# Patient Record
Sex: FEMALE | Race: BLACK OR AFRICAN AMERICAN | NOT HISPANIC OR LATINO
[De-identification: names, ages, dates, MRNs, and addresses within clinical notes are randomized per-mention and may not be internally consistent; named-entity substitution may affect disease eponyms.]

---

## 2017-01-10 ENCOUNTER — APPOINTMENT (OUTPATIENT)
Dept: MRI IMAGING | Facility: CLINIC | Age: 57
End: 2017-01-10

## 2017-01-19 ENCOUNTER — APPOINTMENT (OUTPATIENT)
Dept: MRI IMAGING | Facility: CLINIC | Age: 57
End: 2017-01-19

## 2017-01-19 ENCOUNTER — OUTPATIENT (OUTPATIENT)
Dept: OUTPATIENT SERVICES | Facility: HOSPITAL | Age: 57
LOS: 1 days | End: 2017-01-19
Payer: MEDICARE

## 2017-01-19 DIAGNOSIS — Z98.89 OTHER SPECIFIED POSTPROCEDURAL STATES: Chronic | ICD-10-CM

## 2017-01-19 DIAGNOSIS — M54.12 RADICULOPATHY, CERVICAL REGION: ICD-10-CM

## 2017-01-19 DIAGNOSIS — Z98.84 BARIATRIC SURGERY STATUS: Chronic | ICD-10-CM

## 2017-01-19 DIAGNOSIS — Z00.8 ENCOUNTER FOR OTHER GENERAL EXAMINATION: ICD-10-CM

## 2017-01-19 DIAGNOSIS — Z98.1 ARTHRODESIS STATUS: Chronic | ICD-10-CM

## 2017-01-19 PROCEDURE — 72141 MRI NECK SPINE W/O DYE: CPT

## 2017-05-04 ENCOUNTER — RX RENEWAL (OUTPATIENT)
Age: 57
End: 2017-05-04

## 2017-05-04 DIAGNOSIS — K21.9 GASTRO-ESOPHAGEAL REFLUX DISEASE W/OUT ESOPHAGITIS: ICD-10-CM

## 2017-05-23 ENCOUNTER — OTHER (OUTPATIENT)
Age: 57
End: 2017-05-23

## 2017-05-23 DIAGNOSIS — M25.569 PAIN IN UNSPECIFIED KNEE: ICD-10-CM

## 2017-06-01 ENCOUNTER — APPOINTMENT (OUTPATIENT)
Dept: ORTHOPEDIC SURGERY | Facility: CLINIC | Age: 57
End: 2017-06-01

## 2017-11-15 ENCOUNTER — RX RENEWAL (OUTPATIENT)
Age: 57
End: 2017-11-15

## 2018-01-18 ENCOUNTER — OUTPATIENT (OUTPATIENT)
Dept: OUTPATIENT SERVICES | Facility: HOSPITAL | Age: 58
LOS: 1 days | End: 2018-01-18
Payer: MEDICARE

## 2018-01-18 ENCOUNTER — APPOINTMENT (OUTPATIENT)
Dept: MRI IMAGING | Facility: CLINIC | Age: 58
End: 2018-01-18
Payer: MEDICARE

## 2018-01-18 DIAGNOSIS — Z98.89 OTHER SPECIFIED POSTPROCEDURAL STATES: Chronic | ICD-10-CM

## 2018-01-18 DIAGNOSIS — Z98.1 ARTHRODESIS STATUS: Chronic | ICD-10-CM

## 2018-01-18 DIAGNOSIS — Z00.8 ENCOUNTER FOR OTHER GENERAL EXAMINATION: ICD-10-CM

## 2018-01-18 DIAGNOSIS — Z98.84 BARIATRIC SURGERY STATUS: Chronic | ICD-10-CM

## 2018-01-18 PROCEDURE — 72148 MRI LUMBAR SPINE W/O DYE: CPT

## 2018-01-18 PROCEDURE — 72148 MRI LUMBAR SPINE W/O DYE: CPT | Mod: 26

## 2018-12-28 ENCOUNTER — APPOINTMENT (OUTPATIENT)
Dept: MRI IMAGING | Facility: CLINIC | Age: 58
End: 2018-12-28
Payer: MEDICARE

## 2018-12-28 ENCOUNTER — OUTPATIENT (OUTPATIENT)
Dept: OUTPATIENT SERVICES | Facility: HOSPITAL | Age: 58
LOS: 1 days | End: 2018-12-28
Payer: MEDICARE

## 2018-12-28 DIAGNOSIS — Z98.1 ARTHRODESIS STATUS: Chronic | ICD-10-CM

## 2018-12-28 DIAGNOSIS — Z00.8 ENCOUNTER FOR OTHER GENERAL EXAMINATION: ICD-10-CM

## 2018-12-28 DIAGNOSIS — Z98.89 OTHER SPECIFIED POSTPROCEDURAL STATES: Chronic | ICD-10-CM

## 2018-12-28 DIAGNOSIS — Z98.84 BARIATRIC SURGERY STATUS: Chronic | ICD-10-CM

## 2018-12-28 PROCEDURE — 72141 MRI NECK SPINE W/O DYE: CPT | Mod: 26

## 2018-12-28 PROCEDURE — 73221 MRI JOINT UPR EXTREM W/O DYE: CPT | Mod: 26,LT

## 2018-12-28 PROCEDURE — 72141 MRI NECK SPINE W/O DYE: CPT

## 2018-12-28 PROCEDURE — 73221 MRI JOINT UPR EXTREM W/O DYE: CPT

## 2019-03-08 ENCOUNTER — OUTPATIENT (OUTPATIENT)
Dept: OUTPATIENT SERVICES | Facility: HOSPITAL | Age: 59
LOS: 1 days | End: 2019-03-08
Payer: MEDICARE

## 2019-03-08 ENCOUNTER — APPOINTMENT (OUTPATIENT)
Dept: MRI IMAGING | Facility: CLINIC | Age: 59
End: 2019-03-08
Payer: MEDICARE

## 2019-03-08 DIAGNOSIS — Z98.1 ARTHRODESIS STATUS: Chronic | ICD-10-CM

## 2019-03-08 DIAGNOSIS — Z98.84 BARIATRIC SURGERY STATUS: Chronic | ICD-10-CM

## 2019-03-08 DIAGNOSIS — Z98.89 OTHER SPECIFIED POSTPROCEDURAL STATES: Chronic | ICD-10-CM

## 2019-03-08 DIAGNOSIS — Z00.8 ENCOUNTER FOR OTHER GENERAL EXAMINATION: ICD-10-CM

## 2019-03-08 PROCEDURE — 72148 MRI LUMBAR SPINE W/O DYE: CPT | Mod: 26

## 2019-03-08 PROCEDURE — 72148 MRI LUMBAR SPINE W/O DYE: CPT

## 2021-10-29 ENCOUNTER — EMERGENCY (EMERGENCY)
Facility: HOSPITAL | Age: 61
LOS: 1 days | Discharge: HOME | End: 2021-10-29
Attending: EMERGENCY MEDICINE
Payer: MEDICARE

## 2021-10-29 VITALS
HEIGHT: 63 IN | SYSTOLIC BLOOD PRESSURE: 137 MMHG | RESPIRATION RATE: 16 BRPM | HEART RATE: 63 BPM | TEMPERATURE: 98 F | WEIGHT: 179.02 LBS | OXYGEN SATURATION: 97 % | DIASTOLIC BLOOD PRESSURE: 89 MMHG

## 2021-10-29 DIAGNOSIS — Z98.89 OTHER SPECIFIED POSTPROCEDURAL STATES: Chronic | ICD-10-CM

## 2021-10-29 DIAGNOSIS — Z98.84 BARIATRIC SURGERY STATUS: Chronic | ICD-10-CM

## 2021-10-29 DIAGNOSIS — Z98.1 ARTHRODESIS STATUS: Chronic | ICD-10-CM

## 2021-10-29 LAB
ALBUMIN SERPL ELPH-MCNC: 4.1 G/DL — SIGNIFICANT CHANGE UP (ref 3.3–5.2)
ALP SERPL-CCNC: 64 U/L — SIGNIFICANT CHANGE UP (ref 40–120)
ALT FLD-CCNC: 17 U/L — SIGNIFICANT CHANGE UP
ANION GAP SERPL CALC-SCNC: 11 MMOL/L — SIGNIFICANT CHANGE UP (ref 5–17)
APPEARANCE UR: CLEAR — SIGNIFICANT CHANGE UP
AST SERPL-CCNC: 23 U/L — SIGNIFICANT CHANGE UP
BASOPHILS # BLD AUTO: 0.04 K/UL — SIGNIFICANT CHANGE UP (ref 0–0.2)
BASOPHILS NFR BLD AUTO: 0.7 % — SIGNIFICANT CHANGE UP (ref 0–2)
BILIRUB SERPL-MCNC: 0.3 MG/DL — LOW (ref 0.4–2)
BILIRUB UR-MCNC: NEGATIVE — SIGNIFICANT CHANGE UP
BUN SERPL-MCNC: 15.1 MG/DL — SIGNIFICANT CHANGE UP (ref 8–20)
CALCIUM SERPL-MCNC: 9.3 MG/DL — SIGNIFICANT CHANGE UP (ref 8.6–10.2)
CHLORIDE SERPL-SCNC: 103 MMOL/L — SIGNIFICANT CHANGE UP (ref 98–107)
CO2 SERPL-SCNC: 26 MMOL/L — SIGNIFICANT CHANGE UP (ref 22–29)
COLOR SPEC: YELLOW — SIGNIFICANT CHANGE UP
CREAT SERPL-MCNC: 0.73 MG/DL — SIGNIFICANT CHANGE UP (ref 0.5–1.3)
DIFF PNL FLD: NEGATIVE — SIGNIFICANT CHANGE UP
EOSINOPHIL # BLD AUTO: 0.13 K/UL — SIGNIFICANT CHANGE UP (ref 0–0.5)
EOSINOPHIL NFR BLD AUTO: 2.3 % — SIGNIFICANT CHANGE UP (ref 0–6)
GLUCOSE SERPL-MCNC: 105 MG/DL — HIGH (ref 70–99)
GLUCOSE UR QL: NEGATIVE MG/DL — SIGNIFICANT CHANGE UP
HCT VFR BLD CALC: 36.9 % — SIGNIFICANT CHANGE UP (ref 34.5–45)
HGB BLD-MCNC: 11.9 G/DL — SIGNIFICANT CHANGE UP (ref 11.5–15.5)
IMM GRANULOCYTES NFR BLD AUTO: 0.2 % — SIGNIFICANT CHANGE UP (ref 0–1.5)
KETONES UR-MCNC: NEGATIVE — SIGNIFICANT CHANGE UP
LEUKOCYTE ESTERASE UR-ACNC: NEGATIVE — SIGNIFICANT CHANGE UP
LYMPHOCYTES # BLD AUTO: 2.62 K/UL — SIGNIFICANT CHANGE UP (ref 1–3.3)
LYMPHOCYTES # BLD AUTO: 45.6 % — HIGH (ref 13–44)
MCHC RBC-ENTMCNC: 30.1 PG — SIGNIFICANT CHANGE UP (ref 27–34)
MCHC RBC-ENTMCNC: 32.2 GM/DL — SIGNIFICANT CHANGE UP (ref 32–36)
MCV RBC AUTO: 93.2 FL — SIGNIFICANT CHANGE UP (ref 80–100)
MONOCYTES # BLD AUTO: 0.45 K/UL — SIGNIFICANT CHANGE UP (ref 0–0.9)
MONOCYTES NFR BLD AUTO: 7.8 % — SIGNIFICANT CHANGE UP (ref 2–14)
NEUTROPHILS # BLD AUTO: 2.49 K/UL — SIGNIFICANT CHANGE UP (ref 1.8–7.4)
NEUTROPHILS NFR BLD AUTO: 43.4 % — SIGNIFICANT CHANGE UP (ref 43–77)
NITRITE UR-MCNC: NEGATIVE — SIGNIFICANT CHANGE UP
PH UR: 7 — SIGNIFICANT CHANGE UP (ref 5–8)
PLATELET # BLD AUTO: 164 K/UL — SIGNIFICANT CHANGE UP (ref 150–400)
POTASSIUM SERPL-MCNC: 5 MMOL/L — SIGNIFICANT CHANGE UP (ref 3.5–5.3)
POTASSIUM SERPL-SCNC: 5 MMOL/L — SIGNIFICANT CHANGE UP (ref 3.5–5.3)
PROT SERPL-MCNC: 6.8 G/DL — SIGNIFICANT CHANGE UP (ref 6.6–8.7)
PROT UR-MCNC: NEGATIVE MG/DL — SIGNIFICANT CHANGE UP
RBC # BLD: 3.96 M/UL — SIGNIFICANT CHANGE UP (ref 3.8–5.2)
RBC # FLD: 12.6 % — SIGNIFICANT CHANGE UP (ref 10.3–14.5)
SODIUM SERPL-SCNC: 140 MMOL/L — SIGNIFICANT CHANGE UP (ref 135–145)
SP GR SPEC: 1 — LOW (ref 1.01–1.02)
UROBILINOGEN FLD QL: NEGATIVE MG/DL — SIGNIFICANT CHANGE UP
WBC # BLD: 5.74 K/UL — SIGNIFICANT CHANGE UP (ref 3.8–10.5)
WBC # FLD AUTO: 5.74 K/UL — SIGNIFICANT CHANGE UP (ref 3.8–10.5)

## 2021-10-29 PROCEDURE — 96376 TX/PRO/DX INJ SAME DRUG ADON: CPT

## 2021-10-29 PROCEDURE — 80053 COMPREHEN METABOLIC PANEL: CPT

## 2021-10-29 PROCEDURE — 96375 TX/PRO/DX INJ NEW DRUG ADDON: CPT

## 2021-10-29 PROCEDURE — G1004: CPT

## 2021-10-29 PROCEDURE — 74177 CT ABD & PELVIS W/CONTRAST: CPT | Mod: ME

## 2021-10-29 PROCEDURE — 76705 ECHO EXAM OF ABDOMEN: CPT | Mod: 26,RT

## 2021-10-29 PROCEDURE — 74177 CT ABD & PELVIS W/CONTRAST: CPT | Mod: 26,ME

## 2021-10-29 PROCEDURE — 99284 EMERGENCY DEPT VISIT MOD MDM: CPT | Mod: 25

## 2021-10-29 PROCEDURE — 99284 EMERGENCY DEPT VISIT MOD MDM: CPT

## 2021-10-29 PROCEDURE — 85025 COMPLETE CBC W/AUTO DIFF WBC: CPT

## 2021-10-29 PROCEDURE — 76705 ECHO EXAM OF ABDOMEN: CPT

## 2021-10-29 PROCEDURE — 81003 URINALYSIS AUTO W/O SCOPE: CPT

## 2021-10-29 PROCEDURE — 96374 THER/PROPH/DIAG INJ IV PUSH: CPT | Mod: XU

## 2021-10-29 PROCEDURE — 36415 COLL VENOUS BLD VENIPUNCTURE: CPT

## 2021-10-29 RX ORDER — KETOROLAC TROMETHAMINE 30 MG/ML
15 SYRINGE (ML) INJECTION ONCE
Refills: 0 | Status: DISCONTINUED | OUTPATIENT
Start: 2021-10-29 | End: 2021-10-29

## 2021-10-29 RX ORDER — SODIUM CHLORIDE 9 MG/ML
1000 INJECTION INTRAMUSCULAR; INTRAVENOUS; SUBCUTANEOUS ONCE
Refills: 0 | Status: COMPLETED | OUTPATIENT
Start: 2021-10-29 | End: 2021-10-29

## 2021-10-29 RX ORDER — MORPHINE SULFATE 50 MG/1
4 CAPSULE, EXTENDED RELEASE ORAL ONCE
Refills: 0 | Status: DISCONTINUED | OUTPATIENT
Start: 2021-10-29 | End: 2021-10-29

## 2021-10-29 RX ADMIN — SODIUM CHLORIDE 1000 MILLILITER(S): 9 INJECTION INTRAMUSCULAR; INTRAVENOUS; SUBCUTANEOUS at 17:07

## 2021-10-29 RX ADMIN — Medication 15 MILLIGRAM(S): at 18:58

## 2021-10-29 RX ADMIN — Medication 15 MILLIGRAM(S): at 21:52

## 2021-10-29 RX ADMIN — Medication 15 MILLIGRAM(S): at 19:20

## 2021-10-29 RX ADMIN — MORPHINE SULFATE 4 MILLIGRAM(S): 50 CAPSULE, EXTENDED RELEASE ORAL at 17:07

## 2021-10-29 NOTE — ED STATDOCS - NSICDXFAMILYHX_GEN_ALL_CORE_FT
FAMILY HISTORY:  Family history of diabetes mellitus  Family history of ovarian cancer, Mother  of ovarian cancer at age 62

## 2021-10-29 NOTE — ED PROVIDER NOTE - NSFOLLOWUPINSTRUCTIONS_ED_ALL_ED_FT
please follow with primary care doctor and with pain management   please bring all results to your follow up appointment   continue all prescribed medications   new or worsening symptoms return to the ER

## 2021-10-29 NOTE — ED PROVIDER NOTE - PATIENT PORTAL LINK FT
You can access the FollowMyHealth Patient Portal offered by [facility] by registering at the following website: [email or web address]. By joining [facility]’s FollowMyHealth portal, you will also be able to view your health information using other applications (apps) compatible with our system.

## 2021-10-29 NOTE — ED STATDOCS - ATTENDING CONTRIBUTION TO CARE
61 y/o female with h/o right back pain comes in c/o right sided abdominal pain different than previous episodes  no n/v/d no fever   UA CT

## 2021-10-29 NOTE — ED STATDOCS - NSICDXPASTSURGICALHX_GEN_ALL_CORE_FT
PAST SURGICAL HISTORY:  History of carpal tunnel release on left wrist 2012    S/P bariatric surgery 2003    S/P  x 2    S/P cervical spinal fusion     S/P shoulder surgery Left (  )  & Joint resection in 2009

## 2021-10-29 NOTE — ED STATDOCS - OBJECTIVE STATEMENT
61 y/o female with h/o right back pain comes in c/o right sided abdominal pain different than previous episodes  no n/v/d no fever

## 2021-11-01 ENCOUNTER — EMERGENCY (EMERGENCY)
Facility: HOSPITAL | Age: 61
LOS: 1 days | Discharge: HOME | End: 2021-11-01
Attending: STUDENT IN AN ORGANIZED HEALTH CARE EDUCATION/TRAINING PROGRAM
Payer: MEDICARE

## 2021-11-01 VITALS
SYSTOLIC BLOOD PRESSURE: 158 MMHG | DIASTOLIC BLOOD PRESSURE: 66 MMHG | OXYGEN SATURATION: 98 % | RESPIRATION RATE: 20 BRPM | TEMPERATURE: 99 F | HEIGHT: 63 IN | HEART RATE: 60 BPM | WEIGHT: 179.02 LBS

## 2021-11-01 DIAGNOSIS — Z98.89 OTHER SPECIFIED POSTPROCEDURAL STATES: Chronic | ICD-10-CM

## 2021-11-01 DIAGNOSIS — Z98.84 BARIATRIC SURGERY STATUS: Chronic | ICD-10-CM

## 2021-11-01 DIAGNOSIS — Z98.1 ARTHRODESIS STATUS: Chronic | ICD-10-CM

## 2021-11-01 PROCEDURE — 99284 EMERGENCY DEPT VISIT MOD MDM: CPT

## 2021-11-01 PROCEDURE — 82962 GLUCOSE BLOOD TEST: CPT

## 2021-11-01 PROCEDURE — 99283 EMERGENCY DEPT VISIT LOW MDM: CPT | Mod: 25

## 2021-11-01 PROCEDURE — 96372 THER/PROPH/DIAG INJ SC/IM: CPT

## 2021-11-01 RX ORDER — LIDOCAINE 4 G/100G
1 CREAM TOPICAL ONCE
Refills: 0 | Status: COMPLETED | OUTPATIENT
Start: 2021-11-01 | End: 2021-11-01

## 2021-11-01 RX ORDER — ACETAMINOPHEN 500 MG
975 TABLET ORAL ONCE
Refills: 0 | Status: COMPLETED | OUTPATIENT
Start: 2021-11-01 | End: 2021-11-01

## 2021-11-01 RX ORDER — FAMOTIDINE 10 MG/ML
1 INJECTION INTRAVENOUS
Qty: 7 | Refills: 0
Start: 2021-11-01 | End: 2021-11-07

## 2021-11-01 RX ORDER — KETOROLAC TROMETHAMINE 30 MG/ML
30 SYRINGE (ML) INJECTION ONCE
Refills: 0 | Status: DISCONTINUED | OUTPATIENT
Start: 2021-11-01 | End: 2021-11-01

## 2021-11-01 RX ORDER — SUCRALFATE 1 G
1 TABLET ORAL
Qty: 12 | Refills: 0
Start: 2021-11-01 | End: 2021-11-04

## 2021-11-01 RX ADMIN — Medication 30 MILLIGRAM(S): at 15:16

## 2021-11-01 RX ADMIN — LIDOCAINE 1 PATCH: 4 CREAM TOPICAL at 15:16

## 2021-11-01 RX ADMIN — Medication 975 MILLIGRAM(S): at 15:16

## 2021-11-01 NOTE — ED STATDOCS - OBJECTIVE STATEMENT
61 y/o female with PMHx of arthritis, HTN and DM on metformin c/o abdominal pain. Patient reports of abdominal pain radiating to the back x4 days and diarrhea yesterday. Patient was here Friday for the same issue. Patient had a CAT scan and ultrasound done which came back normal. Patient took percocet @7am with no relief. Patient does have a pain specialist. Denies vomiting, fever, dysuria, hematuria, chest pain, difficulty breathing, 59 y/o female with PMHx of arthritis, HTN and DM on metformin c/o abdominal pain. Patient reports of abdominal pain radiating to the back x4 days and diarrhea yesterday. Patient was here Friday for the same issue. Patient had a CAT scan and ultrasound which were negative. Patient took percocet @7am with no relief. Patient does have a pain specialist. Denies vomiting, fever, dysuria, hematuria, chest pain, difficulty breathing, 59 y/o female with PMHx of arthritis, HTN and DM on metformin c/o abdominal pain. Patient reports of abdominal pain radiating to the flank x 4 days and nonbloody diarrhea yesterday. Patient was here Friday for the same issue. Patient had a CT scan and ultrasound which were negative. Patient took percocet @7am with no relief. Patient follows with a pain specialist. Denies vomiting, fever, dysuria, hematuria, chest pain, difficulty breathing. She denies any change in pain since last visit.

## 2021-11-01 NOTE — ED STATDOCS - PHYSICAL EXAMINATION
General: tearful, no acute distress  Head: normocephalic, atraumatic  Eyes: PERRL   Mouth: moist mucous membranes  Neck: supple neck, no lymphadenopathy  CV: normal rate and rhythm, no LE edema, peripheral pulses 2+ bilateral UE and LE  Respiratory: clear to auscultation bilaterally  Abdomen: soft, tenderness to right abdomen.   : no suprapubic tenderness, no CVAT  MSK: no joint deformities, tenderness to right lower back , right flank   Neuro: alert and oriented x3, speech clear, moving all extremities spontaneously without difficulty  Skin: no rash noted General: tearful, no acute distress  Head: normocephalic, atraumatic  Eyes: PERRL   Mouth: moist mucous membranes  Neck: supple neck, no lymphadenopathy  CV: normal rate and rhythm, no LE edema, peripheral pulses 2+ bilateral UE and LE  Respiratory: clear to auscultation bilaterally  Abdomen: soft, tenderness to palpation of right abdomen  : no suprapubic tenderness, no CVAT  MSK: no joint deformities, tenderness to palpation of right lower back and flank   Neuro: alert and oriented x3, speech clear, moving all extremities spontaneously without difficulty  Skin: no rash noted on back, flank, or abdomen

## 2021-11-01 NOTE — ED ADULT TRIAGE NOTE - CHIEF COMPLAINT QUOTE
Patient present to ER C/O abdominal pain, patient seen at [facility] 2 days ago, patient to follow up with GI, denies N/V, resp even/unlabored. Patient present to ER C/O abdominal pain, patient seen at [facility] 2 days ago, patient to follow up with GI, denies N/V, resp even/unlabored.

## 2021-11-01 NOTE — ED STATDOCS - ATTENDING CONTRIBUTION TO CARE
I have personally performed a face to face medical and diagnostic evaluation of the patient. I have discussed the case with the ACP and reviewed their addition to the note. This note including HPI / ROS / PE / MDM was written by the scribe in my presence.

## 2021-11-01 NOTE — ED STATDOCS - NSFOLLOWUPINSTRUCTIONS_ED_ALL_ED_FT
You were seen and evaluated in the emergency room for abdominal pain.    Please follow up with your primary care provider in the next few days.  You should also follow up with your pain management doctor.    Take 650mg tylenol every 6 hours as needed for pain.  Take 400mg ibuprofen every 6 hours as needed for pain, make sure to take with food.  Use a lidocaine patch (salonpas) on the area for up to 12 hours at a time as needed.     You can apply heat compress for 20 to 30 minutes every 2 hours.     Continue all other home medications as prescribed.    Seek care immediately if:   You have severe pain in your abdomen.   Your bowel movement has blood in it, or looks like black tar.   You cannot stop vomiting, or vomit blood.  Your abdomen is larger than usual, very painful, and hard.   You stop passing gas or having bowel movements.   You have heavy vaginal bleeding.  You have chest pain or shortness of breath.  You feel weak, dizzy, or faint.  Or any worse or abnormal symptoms.     Please read all attached.

## 2021-11-01 NOTE — ED STATDOCS - NS ED ROS FT
General: no fevers  Head: no headaches  Eyes: no vision change  ENT: no nasal discharge/congestion, no sore throat, no ear pain  CV: no chest pain  Resp: no SOB, no cough  GI: no N/V +D, + abdominal pain, no blood in stool  : no dysuria, no hematuria, no vaginal discharge  MSK: no joint pain, no muscle aches, no neck pain +back pain  Skin: no new rash  Neuro: no focal weakness, no change in sensation

## 2021-11-01 NOTE — ED ADULT NURSE NOTE - CHIEF COMPLAINT QUOTE
Patient present to ER C/O abdominal pain, patient seen at [facility] 2 days ago, patient to follow up with GI, denies N/V, resp even/unlabored.

## 2021-11-01 NOTE — ED STATDOCS - CARE PROVIDER_API CALL
Natalie Avalos (DO)  Gastroenterology  [address], [address]  [place] [address]  Phone: [phone]  Fax: [phone]  Follow Up Time:

## 2021-11-01 NOTE — ED STATDOCS - PROGRESS NOTE DETAILS
PA NOTE: Pt with improvement in symptoms s/p treatment. Advised to follow up with Dr. Avalos and return to ED for any new or worsening symptoms.

## 2021-11-01 NOTE — ED ADULT NURSE NOTE - OBJECTIVE STATEMENT
pt with reports of abd pain, appears comfortable. even and unlabored resps present, skin warm dry and intact. no vomiting, reports here 2 days ago for same with negative workup. even and unlabored resps no chest pain no SOB

## 2021-11-01 NOTE — ED STATDOCS - CLINICAL SUMMARY MEDICAL DECISION MAKING FREE TEXT BOX
59 y/o woman present with CAT and ultrasound negative. No   Will give pain medication and   Will give information for out patient follow up with back and spine. 59 y/o woman present with persistent symptoms. Recent CAT scan and ultrasound were negative. No actionable alterations at this time.  Will give pain medication and precautions instruction to return to the ED. Will give information for out patient follow up with back and spine specialists. 61 y/o woman present with persistent symptoms. Recent CT scan and ultrasound were negative with no actionable findings and no change in quality of symptoms from work up. Will give pain medication and discussed return precautions. Discussed need for close outpatient follow up with back and pain management as well as GI.

## 2021-11-01 NOTE — ED STATDOCS - PATIENT PORTAL LINK FT
You can access the FollowMyHealth Patient Portal offered by [facility] by registering at the following website: [email or web address]. By joining [facility]’s FollowMyHealth portal, you will also be able to view your health information using other applications (apps) compatible with our system.

## 2022-01-19 ENCOUNTER — APPOINTMENT (OUTPATIENT)
Dept: GASTROENTEROLOGY | Facility: CLINIC | Age: 62
End: 2022-01-19
Payer: MEDICARE

## 2022-01-19 VITALS
OXYGEN SATURATION: 97 % | HEART RATE: 76 BPM | SYSTOLIC BLOOD PRESSURE: 130 MMHG | TEMPERATURE: 98 F | WEIGHT: 186 LBS | DIASTOLIC BLOOD PRESSURE: 80 MMHG | HEIGHT: 63 IN | BODY MASS INDEX: 32.96 KG/M2 | RESPIRATION RATE: 16 BRPM

## 2022-01-19 PROCEDURE — 99204 OFFICE O/P NEW MOD 45 MIN: CPT

## 2022-01-19 NOTE — PHYSICAL EXAM
[General Appearance - Alert] : alert [General Appearance - In No Acute Distress] : in no acute distress [General Appearance - Well Nourished] : well nourished [General Appearance - Well Developed] : well developed [Sclera] : the sclera and conjunctiva were normal [] : no respiratory distress [Respiration, Rhythm And Depth] : normal respiratory rhythm and effort [Exaggerated Use Of Accessory Muscles For Inspiration] : no accessory muscle use [Auscultation Breath Sounds / Voice Sounds] : lungs were clear to auscultation bilaterally [Apical Impulse] : the apical impulse was normal [Heart Rate And Rhythm] : heart rate was normal and rhythm regular [Heart Sounds] : normal S1 and S2 [Bowel Sounds] : normal bowel sounds [Abdomen Soft] : soft [Abdomen Tenderness] : non-tender [Normal Sphincter Tone] : normal sphincter tone [No Rectal Mass] : no rectal mass [Internal Hemorrhoid] : no internal hemorrhoids [External Hemorrhoid] : no external hemorrhoids [Occult Blood Positive] : stool was negative for occult blood [Cervical Lymph Nodes Enlarged Posterior Bilaterally] : posterior cervical [Abnormal Walk] : normal gait [Skin Color & Pigmentation] : normal skin color and pigmentation [Oriented To Time, Place, And Person] : oriented to person, place, and time [Impaired Insight] : insight and judgment were intact [Affect] : the affect was normal

## 2022-01-19 NOTE — HISTORY OF PRESENT ILLNESS
[de-identified] : Patient was seen in the emergency room October 29 and November 1.  I reviewed the hospital records including labs and CT images.\par    Patient has a history of diabetes hypertension depression GERD high cholesterol.  She had a gastric bypass in 2003.  Patient states she had 3 days of right flank pain radiating to the lower back.  Worse with directly laying on that side.  CT showed a distended gallbladder, ultrasound showed the same.  LFTs and CBC were normal .  CBD was normal.  She had no constipation diarrhea black stools or bloody stools.  She has no unintentional weight loss no change in bowel habits no family history of colon cancer or colon polyps.  Patient has history of acid reflux regurgitation and heartburn for the past 10 years.  Controlled with Protonix 40 mg a day.  Symptoms are moderate to severe without medication.  Symptoms are controlled with Protonix daily.  She has no dysphagia.\par    Patient thinks her last endoscopy and colonoscopy were 10 years ago.  She states both were negative.  She has no family history of colon cancer or colon polyps.  No rectal bleeding.

## 2022-01-19 NOTE — ASSESSMENT
[FreeTextEntry1] : A/P\par Patient had right flank pain which has resolved.  Symptoms are likely musculoskeletal.  Incidentally she was found to have a distended gallbladder on CAT scan and ultrasound which clinically are insignificant.  Liver function tests were normal.\par \par gerd\par Today's instructions for acid reflux include avoid provocative foods. For example citrus alcohol coffee chocolate mints. Smaller meals, no eating 3 hours prior to bedtime and elevate head of the bed prior to sleep.\par Continue Protonix\par \par \par Screening colonoscopy in 5 years.  Follow-up as needed

## 2022-07-15 ENCOUNTER — APPOINTMENT (OUTPATIENT)
Dept: ORTHOPEDIC SURGERY | Facility: CLINIC | Age: 62
End: 2022-07-15

## 2022-07-15 VITALS
HEIGHT: 63 IN | SYSTOLIC BLOOD PRESSURE: 174 MMHG | HEART RATE: 54 BPM | DIASTOLIC BLOOD PRESSURE: 94 MMHG | WEIGHT: 186 LBS | BODY MASS INDEX: 32.96 KG/M2

## 2022-07-15 DIAGNOSIS — M17.11 UNILATERAL PRIMARY OSTEOARTHRITIS, RIGHT KNEE: ICD-10-CM

## 2022-07-15 PROCEDURE — 99204 OFFICE O/P NEW MOD 45 MIN: CPT

## 2022-07-15 NOTE — HISTORY OF PRESENT ILLNESS
[de-identified] : 61 year old female presents complaining of right knee pain with no recent traumatic injury.She has a long-standing history of right knee osteoarthritis and has had conservative treatment with injections physical therapy anti-inflammatory medication. She currently goes to pain management and is on oxycodone 10 mg 4-6 times a day for pain.She denies fever chills night sweats has no paresthesia no extremity no other orthopedic complaints  She ambulates without assistive devices. Her pain to be  8/10 worse with ascending descending Stairs bending and squatting type maneuvers motor his ambulation.

## 2022-07-15 NOTE — DISCUSSION/SUMMARY
[Medication Risks Reviewed] : Medication risks reviewed [Surgical risks reviewed] : Surgical risks reviewed [de-identified] : Right knee osteoarthritis\par Right knee pain\par \par \par \par Treatment options were reviewed with the patient demonstrate arthritis of the right knee failure of conservative treatment. Risks benefits and alternatives to right total knee arthroplasty review with the patient the preoperative operative and postoperative course also reviewed and all the questions were answered\par \par The patient is an year old women with bone on bone arthritis of their right knee . Based upon the patients continued symptoms and failure to respond to conservative treatment I have recommended a left total knee replacement for this patient. A long discussion took place with the patient describing what a total joint replacement is and what the procedure would entail. A total knee model, similar to the implant that will be used during the operation was utilized to demonstrate and to discuss the various bearing surfaces of the implants. The hospitalization and post-operative care and rehabilitation were also discussed. The use of perioperative antibiotics and DVT prophylaxis were discussed. The risk, benefits and alternatives to a surgical intervention were discussed at length with the patient. The patient was also advised of risks related to the medical comorbidities and elevated body mass index (BMI).  A lengthy discussion took place to review the most common complications including but not limited to: deep vein thrombosis, pulmonary embolus, heart attack, stroke, infection, wound breakdown, numbness, damage to nerves, tendon, muscles, arteries or other blood vessels, death and other possible complications from anesthesia. The patient was told that we will take steps to minimize these risks by using sterile technique, antibiotics and DVT prophylaxis when appropriate and follow the patient postoperatively in the office setting to monitor progress. The possibility of recurrent pain, no improvement in pain and actual worsening of pain were also discussed with the patient. \par \par The patient was advised of the goals, alternatives, risks and benefits of autologous, directed and banked blood product transfusions for perioperative blood losses.\par The discharge plan of care focused on the patient going home following surgery.  I encouraged the patient to make the necessary arrangements to have someone stay with them when they are discharged home.  Following discharge, a home care nurse will visit the patient.  He/she will open your home care case and request home physical therapy services.  Home physical therapy will commence following discharge provided it is appropriate and covered by his health insurance benefit plan.  \par \par The patient was advised that they will require a medical preoperative risk evaluation by their PCP. Further medical subspecialty clearances such as cardiac may be indicated if felt needed by their PCP.\par \par The benefits of surgery were discussed with the patient including the potential for improving his/her current clinical condition through operative intervention. Alternatives to surgical intervention including continued conservative management were also discussed in detail. All questions were answered to the satisfaction of the patient. The treatment plan of care, as well as a model of a total knee equivalent to the one that will be used for their total joint replacement, was shared with the patient.  The patient agreed to the plan of care as well as the use of implants in their total joint replacement.\par The patient participated in an interactive discussion of the TKR implant planned for their surgery with questions answered, agreed with the treatment plan, and has decided to move forward with elective TKR as planned.\par

## 2022-07-15 NOTE — REASON FOR VISIT
[Knee Pain] : knee pain [Osteoarthritis, Knee] : osteoarthritis, knee [FreeTextEntry2] : right knee pain

## 2022-07-15 NOTE — PHYSICAL EXAM
[Antalgic] : antalgic [LE] : Sensory: Intact in bilateral lower extremities [DP] : dorsalis pedis 2+ and symmetric bilaterally [PT] : posterior tibial 2+ and symmetric bilaterally [Normal] : no peripheral adenopathy appreciated [de-identified] : Examination of the right knee: Skin is warm and dry without lesions there is a varus deformity noted his tendons all 3 compartments range of motion 0-115° of flexion ligamentous exam stable with varus stress anterior posterior drawer there is palpable effusion noted. [de-identified] : 3 views of the right knee from an outside facility: There is arthritic changes noted with almost bone-on-bone contact in the medial patellofemoral compartments as better to osteophytes and cystic changes.\par \par

## 2022-08-19 ENCOUNTER — OUTPATIENT (OUTPATIENT)
Dept: OUTPATIENT SERVICES | Facility: HOSPITAL | Age: 62
LOS: 1 days | End: 2022-08-19
Payer: MEDICARE

## 2022-08-19 VITALS
RESPIRATION RATE: 8 BRPM | DIASTOLIC BLOOD PRESSURE: 88 MMHG | HEIGHT: 63 IN | TEMPERATURE: 98 F | WEIGHT: 188.94 LBS | OXYGEN SATURATION: 96 % | SYSTOLIC BLOOD PRESSURE: 142 MMHG | HEART RATE: 61 BPM

## 2022-08-19 DIAGNOSIS — Z01.818 ENCOUNTER FOR OTHER PREPROCEDURAL EXAMINATION: ICD-10-CM

## 2022-08-19 DIAGNOSIS — Z98.1 ARTHRODESIS STATUS: Chronic | ICD-10-CM

## 2022-08-19 DIAGNOSIS — Z98.89 OTHER SPECIFIED POSTPROCEDURAL STATES: Chronic | ICD-10-CM

## 2022-08-19 DIAGNOSIS — Z29.9 ENCOUNTER FOR PROPHYLACTIC MEASURES, UNSPECIFIED: ICD-10-CM

## 2022-08-19 DIAGNOSIS — M19.90 UNSPECIFIED OSTEOARTHRITIS, UNSPECIFIED SITE: ICD-10-CM

## 2022-08-19 DIAGNOSIS — I10 ESSENTIAL (PRIMARY) HYPERTENSION: ICD-10-CM

## 2022-08-19 DIAGNOSIS — Z98.84 BARIATRIC SURGERY STATUS: Chronic | ICD-10-CM

## 2022-08-19 DIAGNOSIS — J44.9 CHRONIC OBSTRUCTIVE PULMONARY DISEASE, UNSPECIFIED: ICD-10-CM

## 2022-08-19 DIAGNOSIS — M48.00 SPINAL STENOSIS, SITE UNSPECIFIED: ICD-10-CM

## 2022-08-19 LAB
A1C WITH ESTIMATED AVERAGE GLUCOSE RESULT: 6.4 % — HIGH (ref 4–5.6)
ANION GAP SERPL CALC-SCNC: 12 MMOL/L — SIGNIFICANT CHANGE UP (ref 5–17)
APTT BLD: 30.5 SEC — SIGNIFICANT CHANGE UP (ref 27.5–35.5)
BASOPHILS # BLD AUTO: 0.04 K/UL — SIGNIFICANT CHANGE UP (ref 0–0.2)
BASOPHILS NFR BLD AUTO: 0.8 % — SIGNIFICANT CHANGE UP (ref 0–2)
BLD GP AB SCN SERPL QL: SIGNIFICANT CHANGE UP
BUN SERPL-MCNC: 10.6 MG/DL — SIGNIFICANT CHANGE UP (ref 8–20)
CALCIUM SERPL-MCNC: 10 MG/DL — SIGNIFICANT CHANGE UP (ref 8.4–10.5)
CHLORIDE SERPL-SCNC: 104 MMOL/L — SIGNIFICANT CHANGE UP (ref 98–107)
CO2 SERPL-SCNC: 26 MMOL/L — SIGNIFICANT CHANGE UP (ref 22–29)
CREAT SERPL-MCNC: 1.01 MG/DL — SIGNIFICANT CHANGE UP (ref 0.5–1.3)
EGFR: 64 ML/MIN/1.73M2 — SIGNIFICANT CHANGE UP
EOSINOPHIL # BLD AUTO: 0.11 K/UL — SIGNIFICANT CHANGE UP (ref 0–0.5)
EOSINOPHIL NFR BLD AUTO: 2.3 % — SIGNIFICANT CHANGE UP (ref 0–6)
ESTIMATED AVERAGE GLUCOSE: 137 MG/DL — HIGH (ref 68–114)
GLUCOSE SERPL-MCNC: 130 MG/DL — HIGH (ref 70–99)
HCT VFR BLD CALC: 40.4 % — SIGNIFICANT CHANGE UP (ref 34.5–45)
HGB BLD-MCNC: 13.4 G/DL — SIGNIFICANT CHANGE UP (ref 11.5–15.5)
IMM GRANULOCYTES NFR BLD AUTO: 0.2 % — SIGNIFICANT CHANGE UP (ref 0–1.5)
INR BLD: 1.08 RATIO — SIGNIFICANT CHANGE UP (ref 0.88–1.16)
LYMPHOCYTES # BLD AUTO: 2.73 K/UL — SIGNIFICANT CHANGE UP (ref 1–3.3)
LYMPHOCYTES # BLD AUTO: 57.7 % — HIGH (ref 13–44)
MCHC RBC-ENTMCNC: 29.8 PG — SIGNIFICANT CHANGE UP (ref 27–34)
MCHC RBC-ENTMCNC: 33.2 GM/DL — SIGNIFICANT CHANGE UP (ref 32–36)
MCV RBC AUTO: 90 FL — SIGNIFICANT CHANGE UP (ref 80–100)
MONOCYTES # BLD AUTO: 0.34 K/UL — SIGNIFICANT CHANGE UP (ref 0–0.9)
MONOCYTES NFR BLD AUTO: 7.2 % — SIGNIFICANT CHANGE UP (ref 2–14)
MRSA PCR RESULT.: SIGNIFICANT CHANGE UP
NEUTROPHILS # BLD AUTO: 1.5 K/UL — LOW (ref 1.8–7.4)
NEUTROPHILS NFR BLD AUTO: 31.8 % — LOW (ref 43–77)
PLATELET # BLD AUTO: 176 K/UL — SIGNIFICANT CHANGE UP (ref 150–400)
POTASSIUM SERPL-MCNC: 3.9 MMOL/L — SIGNIFICANT CHANGE UP (ref 3.5–5.3)
POTASSIUM SERPL-SCNC: 3.9 MMOL/L — SIGNIFICANT CHANGE UP (ref 3.5–5.3)
PROTHROM AB SERPL-ACNC: 12.5 SEC — SIGNIFICANT CHANGE UP (ref 10.5–13.4)
RBC # BLD: 4.49 M/UL — SIGNIFICANT CHANGE UP (ref 3.8–5.2)
RBC # FLD: 12.4 % — SIGNIFICANT CHANGE UP (ref 10.3–14.5)
S AUREUS DNA NOSE QL NAA+PROBE: DETECTED
SODIUM SERPL-SCNC: 142 MMOL/L — SIGNIFICANT CHANGE UP (ref 135–145)
WBC # BLD: 4.73 K/UL — SIGNIFICANT CHANGE UP (ref 3.8–10.5)
WBC # FLD AUTO: 4.73 K/UL — SIGNIFICANT CHANGE UP (ref 3.8–10.5)

## 2022-08-19 PROCEDURE — 93010 ELECTROCARDIOGRAM REPORT: CPT

## 2022-08-19 PROCEDURE — G0463: CPT

## 2022-08-19 PROCEDURE — 93005 ELECTROCARDIOGRAM TRACING: CPT

## 2022-08-19 RX ORDER — IRON,CARBONYL/ASCORBIC ACID 65MG-125MG
1 TABLET, DELAYED RELEASE (ENTERIC COATED) ORAL
Qty: 0 | Refills: 0 | DISCHARGE

## 2022-08-19 RX ORDER — LATANOPROSTENE BUNOD 0.24 MG/ML
1 SOLUTION/ DROPS OPHTHALMIC
Qty: 0 | Refills: 0 | DISCHARGE

## 2022-08-19 RX ORDER — PREGABALIN 225 MG/1
1 CAPSULE ORAL
Qty: 0 | Refills: 0 | DISCHARGE

## 2022-08-19 RX ORDER — TIZANIDINE 4 MG/1
1 TABLET ORAL
Qty: 0 | Refills: 0 | DISCHARGE

## 2022-08-19 RX ORDER — DICLOFENAC SODIUM 30 MG/G
0 GEL TOPICAL
Qty: 0 | Refills: 0 | DISCHARGE

## 2022-08-19 RX ORDER — BENZOYL PEROXIDE MICRONIZED 5.8 %
1 TOWELETTE (EA) TOPICAL
Qty: 0 | Refills: 0 | DISCHARGE

## 2022-08-19 NOTE — H&P PST ADULT - PROBLEM SELECTOR PLAN 4
Caprini Score 9: High risk, pharmocologic VTE prophylaxis indicated for most patients (in the absence of contraindications)

## 2022-08-19 NOTE — H&P PST ADULT - PROBLEM SELECTOR PLAN 2
She has a procedure at [facility][1] on 8/26/22 called "MILD," for her lower back pain. Her pain today is 10/10 and she appears uncomfortable.

## 2022-08-19 NOTE — H&P PST ADULT - NSICDXPASTMEDICALHX_GEN_ALL_CORE_FT
PAST MEDICAL HISTORY:  Arthritis     Bronchitis     COPD, mild     Dyslipidemia (high LDL; low HDL)     Genital herpes     GERD (gastroesophageal reflux disease)     Hypertension     OA (osteoarthritis)     Spinal stenosis      PAST MEDICAL HISTORY:  Arthritis     Bronchitis     Dyslipidemia (high LDL; low HDL)     Genital herpes     GERD (gastroesophageal reflux disease)     Hypertension     OA (osteoarthritis)     Spinal stenosis

## 2022-08-19 NOTE — H&P PST ADULT - MUSCULOSKELETAL
details… ROM intact/decreased ROM due to pain/joint swelling/normal gait/strength 5/5 bilateral upper extremities/strength 5/5 bilateral lower extremities

## 2022-08-19 NOTE — H&P PST ADULT - PROBLEM SELECTOR PLAN 1
She is scheduled for Total Right Knee with MD Vazquez on 9/8/22 She is scheduled for Total Right Knee with MD Vazquez on 9/8/22  Medical Clearance on 8/22/22  Cardiac Clearance on 8/30/22

## 2022-08-19 NOTE — H&P PST ADULT - GENERAL
[facility]   Pre-operative History and Physical    Patient: Harpal Multani  : 1950  Acct#: [de-identified]    History Obtained From: electronic medical record    HISTORY OF PRESENT ILLNESS  Procedure:Colonoscopy  Indications:surveillance. Family history of colon cancer. Past Medical History:        Diagnosis Date    CAD (coronary artery disease)     Hyperlipidemia     Hypertension     MI, old      Past Surgical History:        Procedure Laterality Date    APPENDECTOMY      CARDIAC SURGERY      CORONARY ANGIOPLASTY WITH STENT PLACEMENT      PROSTATECTOMY       Medications prior to admission:   Prior to Admission medications    Medication Sig Start Date End Date Taking? Authorizing Provider   atorvastatin (LIPITOR) 40 MG tablet Take 1 tablet by mouth nightly 20  Yes [facility] APRN - CNP   lisinopril (PRINIVIL;ZESTRIL) 5 MG tablet Take 1 tablet by mouth daily 20  Yes ROGERIO Cm - CNP   atenolol (TENORMIN) 25 MG tablet Take 0.5 tablets by mouth daily 20  Yes ROGERIO Cm - CNP   famotidine (PEPCID) 10 MG tablet Take 10 mg by mouth daily   Yes Historical Provider, MD   aspirin 81 MG EC tablet Take 1 tablet by mouth daily 19  Yes Terence Salvador MD   nitroGLYCERIN (NITROSTAT) 0.4 MG SL tablet up to max of 3 total doses. If no relief after 1 dose, call 911. 19  Yes Terence Salvador MD     Allergies:   Patient has no known allergies.     Social History     Socioeconomic History    Marital status:      Spouse name: Not on file    Number of children: Not on file    Years of education: Not on file    Highest education level: Not on file   Occupational History    Not on file   Social Needs    Financial resource strain: Not on file    Food insecurity     Worry: Not on file     Inability: Not on file    Transportation needs     Medical: Not on file     Non-medical: Not on file   Tobacco Use    Smoking status: Former Smoker    Smokeless tobacco: Never Used   Substance and Sexual Activity    Alcohol use: Yes     Alcohol/week: 0.0 - 1.0 standard drinks     Comment: occasionally    Drug use: No    Sexual activity: Not on file   Lifestyle    Physical activity     Days per week: Not on file     Minutes per session: Not on file    Stress: Not on file   Relationships    Social connections     Talks on phone: Not on file     Gets together: Not on file     Attends Anabaptism service: Not on file     Active member of club or organization: Not on file     Attends meetings of clubs or organizations: Not on file     Relationship status: Not on file    Intimate partner violence     Fear of current or ex partner: Not on file     Emotionally abused: Not on file     Physically abused: Not on file     Forced sexual activity: Not on file   Other Topics Concern    Not on file   Social History Narrative    Not on file     Family History   Problem Relation Age of Onset    Pacemaker Mother          PHYSICAL EXAM:      BP (!) 150/69   Pulse 57   Temp 96.9 °F (36.1 °C) (Temporal)   Resp 16   Ht 5' 4\" (1.626 m)   Wt 152 lb (68.9 kg)   SpO2 100%   BMI 26.09 kg/m²  I        Heart:normal    Lungs: normal    Abdomen: normal      ASA Grade:  See anesthesia note      ASSESSMENT AND PLAN:    1. Procedure options, risks and benefits reviewed with patient and expresses understanding. negative

## 2022-08-19 NOTE — H&P PST ADULT - HISTORY OF PRESENT ILLNESS
This is a 61 year old female with PMH of OA, GERD, HTN, HDL, Spinal Stenosis, COPD presents to [facility] from home complaining of right knee pain with no recent traumatic injury. She has a long-standing history of right knee osteoarthritis and has had conservative treatment with injections physical therapy anti-inflammatory medication. She currently goes to pain management and is on oxycodone 10 mg 4-6 times a day for pain.She denies fever chills night sweats has no paresthesia no extremity no other orthopedic complaints She ambulates without assistive devices. Her pain to be 8/10 worse with ascending descending Stairs bending and squatting type maneuvers motor his ambulation. She is scheduled for Right Knee with MD Vazquez on  This is a 61 year old female with PMH of OA, GERD, HTN, HDL, Spinal Stenosis, COPD presents to [facility] from home complaining of right knee pain with no recent traumatic injury. She has a long-standing history of right knee osteoarthritis and has had conservative treatment with injections physical therapy anti-inflammatory medication. She currently goes to pain management and is on oxycodone 10 mg 4-6 times a day for pain. She denies fever chills night sweats has no paresthesia no extremity no other orthopedic complaints She ambulates without assistive devices. Her pain to be 8/10 worse with ascending descending Stairs bending and squatting type maneuvers motor his ambulation. She is scheduled for Total Right Knee with MD Vazquez on 9/8/22 This is a 61 year old female with PMH of OA, GERD, HTN, HDL, Spinal Stenosis presents [facility] from home complaining of right knee pain with no recent traumatic injury x 5 years. She has a long-standing history of right knee osteoarthritis and has had conservative treatment with injections physical therapy anti-inflammatory medication. She currently goes to pain management and is on oxycodone 10 mg 4-6 times a day for pain. She denies fever chills night sweats has no paresthesia no extremity no other orthopedic complaints She ambulates without assistive devices. Her pain today is 10/10 today and worse with ascending descending Stairs bending and squatting type maneuvers motor his ambulation. She is scheduled for Total Right Knee with MD Vazquez on 9/8/22  Medical Clearance on 8/22/22    lower back pain also getting procedure 8/26 for her LSS called "MILD."  @ [facility]  knee [facility]  This is a 61 year old female with PMH of OA, GERD, HTN, HDL, Spinal Stenosis presents to [facility] from home complaining of right knee pain with no recent traumatic injury x 5 years. She has a long-standing history of right knee osteoarthritis and has had conservative treatment with injections physical therapy anti-inflammatory medication. She currently goes to pain management and is on oxycodone 10 mg 4-6 times a day for pain. She also has chronic lower back pain due to spinal stenosis. She has a procedure at [facility] on 8/26/22 called "MILD," for her lower back pain. Her pain today is 10/10 and she appears uncomfortable. States that her right knee has weakness and jose when she is standing for a long period. She reports numbness and tingling in all extremities. She is scheduled for Total Right Knee with MD Vazquez on 9/8/22  Medical Clearance on 8/22/22  Cardiac Clearance on 8/30/22

## 2022-08-19 NOTE — H&P PST ADULT - PROBLEM SELECTOR PLAN 3
Takes Metoprolol. Advised to take morning of procedure.   Takes Losartan advised to not take morning of procedure due to it being in the "ARB Class."

## 2022-08-19 NOTE — H&P PST ADULT - LAB RESULTS AND INTERPRETATION
Pending MSSA+ Attempted to call patient L/M to call PST to be informed of results and be given detailed instructions about the treatment, E- scribed meds to pharmacy.

## 2022-08-19 NOTE — H&P PST ADULT - ASSESSMENT
This is a 61 year old female with PMH of OA, GERD, HTN, HDL, Spinal Stenosis, COPD presents to [facility] from home complaining of right knee pain with no recent traumatic injury. She has a long-standing history of right knee osteoarthritis and has had conservative treatment with injections physical therapy anti-inflammatory medication. She currently goes to pain management and is on oxycodone 10 mg 4-6 times a day for pain.She denies fever chills night sweats has no paresthesia no extremity no other orthopedic complaints She ambulates without assistive devices. Her pain to be 8/10 worse with ascending descending Stairs bending and squatting type maneuvers motor his ambulation. She is scheduled for Total Right Knee with MD Vazquez on 9/8/22 This is a 61 year old female with PMH of OA, GERD, HTN, HDL, Spinal Stenosis presents to [facility] from home complaining of right knee pain with no recent traumatic injury x 5 years. She has a long-standing history of right knee osteoarthritis and has had conservative treatment with injections physical therapy anti-inflammatory medication. She currently goes to pain management and is on oxycodone 10 mg 4-6 times a day for pain. She also has chronic lower back pain due to spinal stenosis. She has a procedure at [facility] on 22 called "MILD," for her lower back pain. Her pain today is 10/10 and she appears uncomfortable. States that her right knee has weakness and jose when she is standing for a long period. She reports numbness and tingling in all extremities.     OPIOID RISK TOOL    HANH EACH BOX THAT APPLIES AND ADD TOTALS AT THE END    FAMILY HISTORY OF SUBSTANCE ABUSE                 FEMALE         MALE                                                Alcohol                             [  ]1 pt          [  ]3pts                                               Illegal Durgs                     [  ]2 pts        [  ]3pts                                               Rx Drugs                           [  ]4 pts        [  ]4 pts    PERSONAL HISTORY OF SUBSTANCE ABUSE                                                                                          Alcohol                             [  ]3 pts       [  ]3 pts                                               Illegal Durgs                     [  ]4 pts        [  ]4 pts                                               Rx Drugs                           [  ]5 pts        [  ]5 pts    AGE BETWEEN 16-45 YEARS                                      [  ]1 pt         [  ]1 pt    HISTORY OF PREADOLESCENT   SEXUAL ABUSE                                                             [  ]3 pts        [  ]0pts    PSYCHOLOGICAL DISEASE                     ADD, OCD, Bipolar, Schizophrenia        [  ]2 pts         [  ]2 pts                      Depression                                               [x  ]1 pt           [  ]1 pt           SCORING TOTAL   (add numbers and type here)              (*1*)                                     A score of 3 or lower indicated LOW risk for future opiod abuse  A score of 4 to 7 indicated moderate risk for future opiod abuse  A score of 8 or higher indicates a high risk for opiod abuse    CAPRINI SCORE    AGE RELATED RISK FACTORS                                                             [x ] Age 41-60 years                                            (1 Point)  [ ] Age: 61-74 years                                           (2 Points)                 [ ] Age= 75 years                                                (3 Points)             DISEASE RELATED RISK FACTORS                                                       [ ] Edema in the lower extremities                 (1 Point)                     [ ] Varicose veins                                               (1 Point)                                 [x ] BMI > 25 Kg/m2                                            (1 Point)                                  [ ] Serious infection (ie PNA)                            (1 Point)                     [ ] Lung disease ( COPD, Emphysema)            (1 Point)                                                                          [ ] Acute myocardial infarction                         (1 Point)                  [ ] Congestive heart failure (in the previous month)  (1 Point)         [ ] Inflammatory bowel disease                            (1 Point)                  [ ] Central venous access, PICC or Port               (2 points)       (within the last month)                                                                [ ] Stroke (in the previous month)                        (5 Points)    [ ] Previous or present malignancy                       (2 points)                                                                                                                                                         HEMATOLOGY RELATED FACTORS                                                         [ ] Prior episodes of VTE                                     (3 Points)                     [ ] Positive family history for VTE                      (3 Points)                  [ ] Prothrombin 08294 A                                     (3 Points)                     [ ] Factor V Leiden                                                (3 Points)                        [ ] Lupus anticoagulants                                      (3 Points)                                                           [ ] Anticardiolipin antibodies                              (3 Points)                                                       [ ] High homocysteine in the blood                   (3 Points)                                             [ ] Other congenital or acquired thrombophilia      (3 Points)                                                [ ] Heparin induced thrombocytopenia                  (3 Points)                                        MOBILITY RELATED FACTORS  [ ] Bed rest                                                         (1 Point)  [ ] Plaster cast                                                    (2 points)  [ ] Bed bound for more than 72 hours           (2 Points)    GENDER SPECIFIC FACTORS  [ ] Pregnancy or had a baby within the last month   (1 Point)  [ ] Post-partum < 6 weeks                                   (1 Point)  [ ] Hormonal therapy  or oral contraception   (1 Point)  [ ] History of pregnancy complications              (1 point)  [ ] Unexplained or recurrent              (1 Point)    OTHER RISK FACTORS                                           (1 Point)  [ ] BMI >40, smoking, diabetes requiring insulin, chemotherapy  blood transfusions and length of surgery over 2 hours    SURGERY RELATED RISK FACTORS  [ ]  Section within the last month     (1 Point)  [ ] Minor surgery                                                  (1 Point)  [ ] Arthroscopic surgery                                       (2 Points)  [x ] Planned major surgery lasting more            (2 Points)      than 45 minutes     [x ] Elective hip or knee joint replacement       (5 points)       surgery                                                TRAUMA RELATED RISK FACTORS  [ ] Fracture of the hip, pelvis, or leg                       (5 Points)  [ ] Spinal cord injury resulting in paralysis             (5 points)       (in the previous month)    [ ] Paralysis  (less than 1 month)                             (5 Points)  [ ] Multiple Trauma within 1 month                        (5 Points)    Total Score [    9  ]    Caprini Score 0-2: Low Risk, NO VTE prophylaxis required for most patients, encourage ambulation  Caprini Score 3-6: Moderate Risk , pharmacologic VTE prophylaxis is indicated for most patients (in the absence of contraindications)  Caprini Score Greater than or =7: High risk, pharmocologic VTE prophylaxis indicated for most patients (in the absence of contraindications)               This is a 61 year old female with PMH of OA, GERD, HTN, HDL, Spinal Stenosis presents to PST from home complaining of right knee pain with no recent traumatic injury x 5 years. Upon assessment + right knee tenderness, with mild swelling. Laterality and procedure verified. Surgical site clear and intact. Pt instructed to stop vitamins/supplements/herbal medications/ASA/NSAIDS for one week prior to surgery and discuss with PMD. Patient educated on surgical scrub, COVID testing, preadmission instructions, medical & Cardiac clearance and day of procedure medications, verbalizes understanding.     OPIOID RISK TOOL    HANH EACH BOX THAT APPLIES AND ADD TOTALS AT THE END    FAMILY HISTORY OF SUBSTANCE ABUSE                 FEMALE         MALE                                                Alcohol                             [  ]1 pt          [  ]3pts                                               Illegal Durgs                     [  ]2 pts        [  ]3pts                                               Rx Drugs                           [  ]4 pts        [  ]4 pts    PERSONAL HISTORY OF SUBSTANCE ABUSE                                                                                          Alcohol                             [  ]3 pts       [  ]3 pts                                               Illegal Durgs                     [  ]4 pts        [  ]4 pts                                               Rx Drugs                           [  ]5 pts        [  ]5 pts    AGE BETWEEN 16-45 YEARS                                      [  ]1 pt         [  ]1 pt    HISTORY OF PREADOLESCENT   SEXUAL ABUSE                                                             [  ]3 pts        [  ]0pts    PSYCHOLOGICAL DISEASE                     ADD, OCD, Bipolar, Schizophrenia        [  ]2 pts         [  ]2 pts                      Depression                                               [x  ]1 pt           [  ]1 pt           SCORING TOTAL   (add numbers and type here)              (*1*)                                     A score of 3 or lower indicated LOW risk for future opiod abuse  A score of 4 to 7 indicated moderate risk for future opiod abuse  A score of 8 or higher indicates a high risk for opiod abuse    CAPRINI SCORE    AGE RELATED RISK FACTORS                                                             [x ] Age 41-60 years                                            (1 Point)  [ ] Age: 61-74 years                                           (2 Points)                 [ ] Age= 75 years                                                (3 Points)             DISEASE RELATED RISK FACTORS                                                       [ ] Edema in the lower extremities                 (1 Point)                     [ ] Varicose veins                                               (1 Point)                                 [x ] BMI > 25 Kg/m2                                            (1 Point)                                  [ ] Serious infection (ie PNA)                            (1 Point)                     [ ] Lung disease ( COPD, Emphysema)            (1 Point)                                                                          [ ] Acute myocardial infarction                         (1 Point)                  [ ] Congestive heart failure (in the previous month)  (1 Point)         [ ] Inflammatory bowel disease                            (1 Point)                  [ ] Central venous access, PICC or Port               (2 points)       (within the last month)                                                                [ ] Stroke (in the previous month)                        (5 Points)    [ ] Previous or present malignancy                       (2 points)                                                                                                                                                         HEMATOLOGY RELATED FACTORS                                                         [ ] Prior episodes of VTE                                     (3 Points)                     [ ] Positive family history for VTE                      (3 Points)                  [ ] Prothrombin 72852 A                                     (3 Points)                     [ ] Factor V Leiden                                                (3 Points)                        [ ] Lupus anticoagulants                                      (3 Points)                                                           [ ] Anticardiolipin antibodies                              (3 Points)                                                       [ ] High homocysteine in the blood                   (3 Points)                                             [ ] Other congenital or acquired thrombophilia      (3 Points)                                                [ ] Heparin induced thrombocytopenia                  (3 Points)                                        MOBILITY RELATED FACTORS  [ ] Bed rest                                                         (1 Point)  [ ] Plaster cast                                                    (2 points)  [ ] Bed bound for more than 72 hours           (2 Points)    GENDER SPECIFIC FACTORS  [ ] Pregnancy or had a baby within the last month   (1 Point)  [ ] Post-partum < 6 weeks                                   (1 Point)  [ ] Hormonal therapy  or oral contraception   (1 Point)  [ ] History of pregnancy complications              (1 point)  [ ] Unexplained or recurrent              (1 Point)    OTHER RISK FACTORS                                           (1 Point)  [ ] BMI >40, smoking, diabetes requiring insulin, chemotherapy  blood transfusions and length of surgery over 2 hours    SURGERY RELATED RISK FACTORS  [ ]  Section within the last month     (1 Point)  [ ] Minor surgery                                                  (1 Point)  [ ] Arthroscopic surgery                                       (2 Points)  [x ] Planned major surgery lasting more            (2 Points)      than 45 minutes     [x ] Elective hip or knee joint replacement       (5 points)       surgery                                                TRAUMA RELATED RISK FACTORS  [ ] Fracture of the hip, pelvis, or leg                       (5 Points)  [ ] Spinal cord injury resulting in paralysis             (5 points)       (in the previous month)    [ ] Paralysis  (less than 1 month)                             (5 Points)  [ ] Multiple Trauma within 1 month                        (5 Points)    Total Score [    9  ]    Caprini Score 0-2: Low Risk, NO VTE prophylaxis required for most patients, encourage ambulation  Caprini Score 3-6: Moderate Risk , pharmacologic VTE prophylaxis is indicated for most patients (in the absence of contraindications)  Caprini Score Greater than or =7: High risk, pharmocologic VTE prophylaxis indicated for most patients (in the absence of contraindications)

## 2022-08-19 NOTE — H&P PST ADULT - OTHER CARE PROVIDERS
Marla Washington [phone] Marla Washington [phone], MD [facility] [phone]

## 2022-08-20 RX ORDER — MUPIROCIN 20 MG/G
1 OINTMENT TOPICAL
Qty: 10 | Refills: 0
Start: 2022-08-20 | End: 2022-08-24

## 2022-09-07 NOTE — ED ADULT NURSE NOTE - NSSEPSISSUSPECTED_ED_A_ED
Pt having intermittent episodes of bradycardia with rates 48-58. All other VS remain stable. Dr. Lamar advised and as long as BP is stable she is not concerned.   
No

## 2022-09-08 ENCOUNTER — APPOINTMENT (OUTPATIENT)
Dept: ORTHOPEDIC SURGERY | Facility: HOSPITAL | Age: 62
End: 2022-09-08

## 2022-09-14 NOTE — ED PROVIDER NOTE - PROGRESS NOTE DETAILS
ARIADNE BURCH   2 days ruq abdominal pain radiating to the back with associated nausea. hx of gastric bypass. no reported fever or chills. no diarrhea. no reported hx of GB issues. no chest pain sob, gb distended on ct without signs of infection. stable lft's and alk phos. + RUQ tenderness on exam. us-gb placed for further evaluation Discharge planning    Patient is getting a PICC placed and will be on rocephine for 6 weeks. Dereck Kerr approved the patient. Probable d/c tomorrow. ARIADNE Gleason: symptomatic improvement with toradol   advised on results and need for fu with PMD and pain management   given full copy of results   dc with fu with pain management and PMD

## 2023-02-11 PROBLEM — K21.9 GASTRO-ESOPHAGEAL REFLUX DISEASE WITHOUT ESOPHAGITIS: Chronic | Status: ACTIVE | Noted: 2022-08-19

## 2023-02-11 PROBLEM — M19.90 UNSPECIFIED OSTEOARTHRITIS, UNSPECIFIED SITE: Chronic | Status: ACTIVE | Noted: 2022-08-19

## 2023-02-11 PROBLEM — E78.5 HYPERLIPIDEMIA, UNSPECIFIED: Chronic | Status: ACTIVE | Noted: 2022-08-19

## 2023-02-11 PROBLEM — M48.00 SPINAL STENOSIS, SITE UNSPECIFIED: Chronic | Status: ACTIVE | Noted: 2022-08-19

## 2023-03-20 ENCOUNTER — OFFICE (OUTPATIENT)
Dept: URBAN - METROPOLITAN AREA CLINIC 115 | Facility: CLINIC | Age: 63
Setting detail: OPHTHALMOLOGY
End: 2023-03-20
Payer: COMMERCIAL

## 2023-03-20 DIAGNOSIS — H35.373: ICD-10-CM

## 2023-03-20 DIAGNOSIS — H40.1122: ICD-10-CM

## 2023-03-20 DIAGNOSIS — E11.3293: ICD-10-CM

## 2023-03-20 DIAGNOSIS — H25.11: ICD-10-CM

## 2023-03-20 DIAGNOSIS — H43.393: ICD-10-CM

## 2023-03-20 DIAGNOSIS — H25.12: ICD-10-CM

## 2023-03-20 DIAGNOSIS — H40.1112: ICD-10-CM

## 2023-03-20 PROCEDURE — 92133 CPTRZD OPH DX IMG PST SGM ON: CPT | Performed by: OPHTHALMOLOGY

## 2023-03-20 PROCEDURE — 99213 OFFICE O/P EST LOW 20 MIN: CPT | Performed by: OPHTHALMOLOGY

## 2023-03-20 ASSESSMENT — AXIALLENGTH_DERIVED
OS_AL: 22.9451
OD_AL: 23.1697
OD_AL: 23.264
OS_AL: 23.0841

## 2023-03-20 ASSESSMENT — PACHYMETRY
OS_CT_CORRECTION: -1
OD_CT_UM: 563
OD_CT_CORRECTION: -1
OS_CT_UM: 566

## 2023-03-20 ASSESSMENT — REFRACTION_MANIFEST
OS_SPHERE: +0.75
OD_CYLINDER: -0.50
OD_AXIS: 095
OD_VA1: 20/30-2
OS_AXIS: 105
OS_VA1: 20/30-2
OS_ADD: +2.00
OD_ADD: +2.00
OS_CYLINDER: -0.25
OD_SPHERE: +0.75

## 2023-03-20 ASSESSMENT — REFRACTION_AUTOREFRACTION
OS_AXIS: 104
OD_AXIS: 053
OS_SPHERE: +1.25
OS_CYLINDER: -0.50
OD_CYLINDER: -0.50
OD_SPHERE: +1.00

## 2023-03-20 ASSESSMENT — CONFRONTATIONAL VISUAL FIELD TEST (CVF)
OD_FINDINGS: FULL
OS_FINDINGS: FULL

## 2023-03-20 ASSESSMENT — TONOMETRY
OD_IOP_MMHG: 17
OS_IOP_MMHG: 12
OS_IOP_MMHG: 14
OD_IOP_MMHG: 12

## 2023-03-20 ASSESSMENT — KERATOMETRY
OD_AXISANGLE_DEGREES: 075
OS_AXISANGLE_DEGREES: 120
OS_K2POWER_DIOPTERS: 44.75
OS_K1POWER_DIOPTERS: 43.75
OD_K1POWER_DIOPTERS: 43.75
OD_K2POWER_DIOPTERS: 44.00

## 2023-03-20 ASSESSMENT — SPHEQUIV_DERIVED
OS_SPHEQUIV: 1
OD_SPHEQUIV: 0.75
OD_SPHEQUIV: 0.5
OS_SPHEQUIV: 0.625

## 2023-03-20 ASSESSMENT — VISUAL ACUITY
OS_BCVA: 20/25+1
OD_BCVA: 20/50

## 2023-04-12 ENCOUNTER — OFFICE (OUTPATIENT)
Dept: URBAN - METROPOLITAN AREA CLINIC 94 | Facility: CLINIC | Age: 63
Setting detail: OPHTHALMOLOGY
End: 2023-04-12
Payer: COMMERCIAL

## 2023-04-12 DIAGNOSIS — H40.1122: ICD-10-CM

## 2023-04-12 DIAGNOSIS — H25.13: ICD-10-CM

## 2023-04-12 DIAGNOSIS — H43.393: ICD-10-CM

## 2023-04-12 DIAGNOSIS — H40.1112: ICD-10-CM

## 2023-04-12 DIAGNOSIS — H25.12: ICD-10-CM

## 2023-04-12 DIAGNOSIS — E11.3293: ICD-10-CM

## 2023-04-12 PROCEDURE — 92136 OPHTHALMIC BIOMETRY: CPT | Performed by: OPHTHALMOLOGY

## 2023-04-12 PROCEDURE — 99214 OFFICE O/P EST MOD 30 MIN: CPT | Performed by: OPHTHALMOLOGY

## 2023-04-12 ASSESSMENT — REFRACTION_MANIFEST
OS_VA1: 20/60
OS_AXIS: 105
OS_SPHERE: +1.00
OD_AXIS: 120
OD_VA1: 20/30-2
OD_VA1: 20/30
OD_CYLINDER: -0.25
OS_VA1: 20/30-2
OD_SPHERE: +0.75
OD_AXIS: 095
OS_SPHERE: +0.75
OD_ADD: +2.00
OS_CYLINDER: -0.25
OS_AXIS: 105
OS_ADD: +2.00
OD_SPHERE: +0.75
OS_CYLINDER: -0.50
OD_CYLINDER: -0.50

## 2023-04-12 ASSESSMENT — AXIALLENGTH_DERIVED
OD_AL: 23.3987
OD_AL: 23.3509
OS_AL: 23.2167
OS_AL: 23.1697
OD_AL: 23.3509
OS_AL: 23.1697

## 2023-04-12 ASSESSMENT — SPHEQUIV_DERIVED
OD_SPHEQUIV: 0.625
OD_SPHEQUIV: 0.5
OS_SPHEQUIV: 0.75
OS_SPHEQUIV: 0.625
OS_SPHEQUIV: 0.75
OD_SPHEQUIV: 0.625

## 2023-04-12 ASSESSMENT — KERATOMETRY
OD_CYLAXISANGLE_DEGREES: 013
OD_K2POWER_DIOPTERS: 43.75
OD_AXISANGLE_DEGREES: 103
OS_K2POWER_DIOPTERS: 44.00
OS_CYLAXISANGLE_DEGREES: 152
OD_K1POWER_DIOPTERS: 43.25
OS_K1POWER_DIOPTERS: 43.75
OD_CYLPOWER_DEGREES: 0.5
OD_AXISANGLE_DEGREES: 013
OD_AXISANGLE2_DEGREES: 013
OS_AXISANGLE_DEGREES: 152
OD_K1POWER_DIOPTERS: 43.25
OS_K2POWER_DIOPTERS: 44.00
OS_CYLPOWER_DEGREES: 0.25
OS_K1POWER_DIOPTERS: 43.75
OS_K1K2_AVERAGE: 43.875
OD_K1K2_AVERAGE: 43.5
OS_AXISANGLE_DEGREES: 62
OD_K2POWER_DIOPTERS: 43.75
OS_AXISANGLE2_DEGREES: 152

## 2023-04-12 ASSESSMENT — PACHYMETRY
OD_CT_CORRECTION: -1
OS_CT_CORRECTION: -1
OD_CT_UM: 563
OS_CT_UM: 566

## 2023-04-12 ASSESSMENT — VISUAL ACUITY
OS_BCVA: 20/30
OD_BCVA: 20/60

## 2023-04-12 ASSESSMENT — REFRACTION_AUTOREFRACTION
OD_AXIS: 120
OS_SPHERE: +1.00
OD_CYLINDER: -0.25
OD_SPHERE: +0.75
OS_CYLINDER: -0.50
OS_AXIS: 106

## 2023-04-12 ASSESSMENT — CONFRONTATIONAL VISUAL FIELD TEST (CVF)
OS_FINDINGS: FULL
OD_FINDINGS: FULL

## 2023-04-12 ASSESSMENT — TONOMETRY
OD_IOP_MMHG: 18
OS_IOP_MMHG: 18

## 2023-05-18 ENCOUNTER — ASC (OUTPATIENT)
Dept: URBAN - METROPOLITAN AREA SURGERY 8 | Facility: SURGERY | Age: 63
Setting detail: OPHTHALMOLOGY
End: 2023-05-18
Payer: COMMERCIAL

## 2023-05-18 DIAGNOSIS — H25.12: ICD-10-CM

## 2023-05-18 PROCEDURE — 66984 XCAPSL CTRC RMVL W/O ECP: CPT | Performed by: OPHTHALMOLOGY

## 2023-06-05 ENCOUNTER — NON-APPOINTMENT (OUTPATIENT)
Age: 63
End: 2023-06-05

## 2023-06-05 ENCOUNTER — APPOINTMENT (OUTPATIENT)
Dept: NEUROLOGY | Facility: CLINIC | Age: 63
End: 2023-06-05

## 2023-07-06 ENCOUNTER — ASC (OUTPATIENT)
Dept: URBAN - METROPOLITAN AREA SURGERY 8 | Facility: SURGERY | Age: 63
Setting detail: OPHTHALMOLOGY
End: 2023-07-06
Payer: COMMERCIAL

## 2023-07-06 DIAGNOSIS — H40.1122: ICD-10-CM

## 2023-07-06 DIAGNOSIS — H25.12: ICD-10-CM

## 2023-07-06 PROCEDURE — 65820 GONIOTOMY: CPT | Performed by: OPHTHALMOLOGY

## 2023-07-06 PROCEDURE — 66984 XCAPSL CTRC RMVL W/O ECP: CPT | Performed by: OPHTHALMOLOGY

## 2023-07-07 ENCOUNTER — RX ONLY (RX ONLY)
Age: 63
End: 2023-07-07

## 2023-07-07 ENCOUNTER — OFFICE (OUTPATIENT)
Dept: URBAN - METROPOLITAN AREA CLINIC 94 | Facility: CLINIC | Age: 63
Setting detail: OPHTHALMOLOGY
End: 2023-07-07
Payer: COMMERCIAL

## 2023-07-07 DIAGNOSIS — Z96.1: ICD-10-CM

## 2023-07-07 PROCEDURE — 99024 POSTOP FOLLOW-UP VISIT: CPT | Performed by: PHYSICIAN ASSISTANT

## 2023-07-07 ASSESSMENT — REFRACTION_MANIFEST
OS_SPHERE: +0.75
OD_SPHERE: +0.75
OS_SPHERE: +1.00
OS_AXIS: 105
OD_VA1: 20/30-2
OS_CYLINDER: -0.50
OD_ADD: +2.00
OD_AXIS: 095
OS_VA1: 20/60
OS_ADD: +2.00
OS_VA1: 20/30-2
OS_CYLINDER: -0.25
OD_AXIS: 120
OD_CYLINDER: -0.50
OD_CYLINDER: -0.25
OS_AXIS: 105
OD_SPHERE: +0.75
OD_VA1: 20/30

## 2023-07-07 ASSESSMENT — PACHYMETRY
OD_CT_UM: 563
OS_CT_UM: 566
OD_CT_CORRECTION: -1
OS_CT_CORRECTION: -1

## 2023-07-07 ASSESSMENT — KERATOMETRY
OS_K2POWER_DIOPTERS: 45.75
OD_K2POWER_DIOPTERS: 43.75
OD_AXISANGLE_DEGREES: 028
OS_K1POWER_DIOPTERS: 43.75
OS_AXISANGLE_DEGREES: 081
OD_K1POWER_DIOPTERS: 43.50

## 2023-07-07 ASSESSMENT — AXIALLENGTH_DERIVED
OS_AL: 22.9096
OS_AL: 22.8638
OS_AL: 22.9096
OD_AL: 23.2586
OD_AL: 23.306
OD_AL: 23.3536

## 2023-07-07 ASSESSMENT — SPHEQUIV_DERIVED
OS_SPHEQUIV: 0.625
OS_SPHEQUIV: 0.75
OD_SPHEQUIV: 0.625
OD_SPHEQUIV: 0.5
OS_SPHEQUIV: 0.625
OD_SPHEQUIV: 0.75

## 2023-07-07 ASSESSMENT — TONOMETRY: OS_IOP_MMHG: 20

## 2023-07-07 ASSESSMENT — REFRACTION_AUTOREFRACTION
OD_AXIS: 063
OS_SPHERE: +1.00
OD_CYLINDER: -0.50
OS_CYLINDER: -0.75
OD_SPHERE: +1.00
OS_AXIS: 176

## 2023-07-07 ASSESSMENT — CONFRONTATIONAL VISUAL FIELD TEST (CVF)
OS_FINDINGS: FULL
OD_FINDINGS: FULL

## 2023-07-07 ASSESSMENT — CORNEAL EDEMA CLINICAL DESCRIPTION: OS_CORNEALEDEMA: 2+

## 2023-07-07 ASSESSMENT — VISUAL ACUITY
OS_BCVA: 20/40
OD_BCVA: 20/40+1

## 2023-07-16 ENCOUNTER — OFFICE (OUTPATIENT)
Dept: URBAN - METROPOLITAN AREA CLINIC 94 | Facility: CLINIC | Age: 63
Setting detail: OPHTHALMOLOGY
End: 2023-07-16
Payer: COMMERCIAL

## 2023-07-16 DIAGNOSIS — H40.1112: ICD-10-CM

## 2023-07-16 DIAGNOSIS — H40.1122: ICD-10-CM

## 2023-07-16 DIAGNOSIS — Z96.1: ICD-10-CM

## 2023-07-16 PROCEDURE — 99024 POSTOP FOLLOW-UP VISIT: CPT | Performed by: REGISTERED NURSE

## 2023-07-16 ASSESSMENT — REFRACTION_MANIFEST
OS_CYLINDER: -0.50
OD_CYLINDER: -0.50
OD_VA1: 20/30-2
OS_VA1: 20/60
OS_AXIS: 105
OS_CYLINDER: -0.25
OD_SPHERE: +0.75
OS_VA1: 20/30-2
OD_AXIS: 095
OD_AXIS: 120
OD_CYLINDER: -0.25
OD_ADD: +2.00
OS_SPHERE: +0.75
OS_AXIS: 105
OD_VA1: 20/30
OS_ADD: +2.00
OS_SPHERE: +1.00
OD_SPHERE: +0.75

## 2023-07-16 ASSESSMENT — AXIALLENGTH_DERIVED
OD_AL: 23.3033
OS_AL: 23.2167
OS_AL: 23.1697
OS_AL: 23.6003
OD_AL: 23.3987
OD_AL: 23.3509

## 2023-07-16 ASSESSMENT — CONFRONTATIONAL VISUAL FIELD TEST (CVF)
OD_FINDINGS: FULL
OS_FINDINGS: FULL

## 2023-07-16 ASSESSMENT — PACHYMETRY
OS_CT_CORRECTION: -1
OS_CT_UM: 566
OD_CT_CORRECTION: -1
OD_CT_UM: 563

## 2023-07-16 ASSESSMENT — VISUAL ACUITY
OS_BCVA: 20/30-1
OD_BCVA: 20/25-1

## 2023-07-16 ASSESSMENT — REFRACTION_AUTOREFRACTION
OD_AXIS: 074
OS_AXIS: 022
OS_CYLINDER: -0.25
OD_CYLINDER: -0.50
OD_SPHERE: +1.00
OS_SPHERE: -0.25

## 2023-07-16 ASSESSMENT — KERATOMETRY
OD_AXISANGLE_DEGREES: 090
OD_K1POWER_DIOPTERS: 43.50
OD_K2POWER_DIOPTERS: 43.50
OS_K1POWER_DIOPTERS: 43.50
OS_K2POWER_DIOPTERS: 44.25
OS_AXISANGLE_DEGREES: 112

## 2023-07-16 ASSESSMENT — SPHEQUIV_DERIVED
OS_SPHEQUIV: 0.625
OD_SPHEQUIV: 0.5
OS_SPHEQUIV: 0.75
OS_SPHEQUIV: -0.375
OD_SPHEQUIV: 0.625
OD_SPHEQUIV: 0.75

## 2023-07-16 ASSESSMENT — CORNEAL EDEMA CLINICAL DESCRIPTION: OS_CORNEALEDEMA: T

## 2023-08-06 ENCOUNTER — OFFICE (OUTPATIENT)
Dept: URBAN - METROPOLITAN AREA CLINIC 94 | Facility: CLINIC | Age: 63
Setting detail: OPHTHALMOLOGY
End: 2023-08-06
Payer: COMMERCIAL

## 2023-08-06 DIAGNOSIS — H40.1132: ICD-10-CM

## 2023-08-06 DIAGNOSIS — H25.11: ICD-10-CM

## 2023-08-06 PROCEDURE — 92136 OPHTHALMIC BIOMETRY: CPT | Performed by: REGISTERED NURSE

## 2023-08-06 PROCEDURE — 99024 POSTOP FOLLOW-UP VISIT: CPT | Performed by: REGISTERED NURSE

## 2023-08-06 ASSESSMENT — VISUAL ACUITY
OD_BCVA: 20/20
OS_BCVA: 20/30

## 2023-08-06 ASSESSMENT — REFRACTION_MANIFEST
OS_SPHERE: +1.00
OS_CYLINDER: -0.50
OD_SPHERE: +0.75
OS_VA1: 20/30-2
OS_ADD: +2.00
OS_VA1: 20/60
OD_ADD: +2.00
OD_AXIS: 095
OD_CYLINDER: -0.25
OS_AXIS: 105
OD_AXIS: 120
OD_CYLINDER: -0.50
OD_SPHERE: +0.75
OS_SPHERE: +0.75
OS_CYLINDER: -0.25
OD_VA1: 20/30
OD_VA1: 20/30-2
OS_AXIS: 105

## 2023-08-06 ASSESSMENT — PACHYMETRY
OS_CT_UM: 566
OS_CT_CORRECTION: -1
OD_CT_CORRECTION: -1
OD_CT_UM: 563

## 2023-08-06 ASSESSMENT — KERATOMETRY
OD_AXISANGLE_DEGREES: 027
METHOD_AUTO_MANUAL: AUTO
OS_K1POWER_DIOPTERS: 43.75
OS_K2POWER_DIOPTERS: 44.25
OS_AXISANGLE_DEGREES: 103
OD_K2POWER_DIOPTERS: 43.75
OD_K1POWER_DIOPTERS: 43.25

## 2023-08-06 ASSESSMENT — REFRACTION_AUTOREFRACTION
OS_CYLINDER: -0.25
OS_AXIS: 054
OD_AXIS: 082
OD_CYLINDER: -0.75
OS_SPHERE: 0.00
OD_SPHERE: +1.00

## 2023-08-06 ASSESSMENT — SPHEQUIV_DERIVED
OD_SPHEQUIV: 0.625
OD_SPHEQUIV: 0.625
OD_SPHEQUIV: 0.5
OS_SPHEQUIV: 0.625
OS_SPHEQUIV: 0.75
OS_SPHEQUIV: -0.125

## 2023-08-06 ASSESSMENT — AXIALLENGTH_DERIVED
OS_AL: 23.1723
OD_AL: 23.3509
OS_AL: 23.4577
OD_AL: 23.3509
OS_AL: 23.1255
OD_AL: 23.3987

## 2023-08-06 ASSESSMENT — CONFRONTATIONAL VISUAL FIELD TEST (CVF)
OD_FINDINGS: FULL
OS_FINDINGS: FULL

## 2023-08-06 ASSESSMENT — TONOMETRY: OS_IOP_MMHG: 15

## 2023-08-06 ASSESSMENT — CORNEAL EDEMA CLINICAL DESCRIPTION: OS_CORNEALEDEMA: T

## 2023-10-17 ENCOUNTER — APPOINTMENT (OUTPATIENT)
Dept: NEUROLOGY | Facility: CLINIC | Age: 63
End: 2023-10-17
Payer: MEDICARE

## 2023-10-17 VITALS — BODY MASS INDEX: 32.78 KG/M2 | HEIGHT: 63 IN | WEIGHT: 185 LBS

## 2023-10-17 DIAGNOSIS — G44.89 OTHER HEADACHE SYNDROME: ICD-10-CM

## 2023-10-17 DIAGNOSIS — R42 DIZZINESS AND GIDDINESS: ICD-10-CM

## 2023-10-17 DIAGNOSIS — Z86.39 PERSONAL HISTORY OF OTHER ENDOCRINE, NUTRITIONAL AND METABOLIC DISEASE: ICD-10-CM

## 2023-10-17 DIAGNOSIS — R55 SYNCOPE AND COLLAPSE: ICD-10-CM

## 2023-10-17 PROCEDURE — 99204 OFFICE O/P NEW MOD 45 MIN: CPT

## 2023-10-17 RX ORDER — LATANOPROSTENE BUNOD 0.24 MG/ML
0.02 SOLUTION/ DROPS OPHTHALMIC
Refills: 0 | Status: ACTIVE | COMMUNITY

## 2023-10-17 RX ORDER — SIMVASTATIN 40 MG/1
40 TABLET, FILM COATED ORAL
Refills: 0 | Status: ACTIVE | COMMUNITY

## 2023-10-17 RX ORDER — PANTOPRAZOLE 40 MG/1
40 TABLET, DELAYED RELEASE ORAL
Refills: 0 | Status: ACTIVE | COMMUNITY

## 2023-10-17 RX ORDER — LOSARTAN POTASSIUM AND HYDROCHLOROTHIAZIDE 12.5; 5 MG/1; MG/1
50-12.5 TABLET ORAL
Refills: 0 | Status: ACTIVE | COMMUNITY

## 2023-10-17 RX ORDER — VALACYCLOVIR 500 MG/1
500 TABLET, FILM COATED ORAL
Refills: 0 | Status: ACTIVE | COMMUNITY

## 2023-10-17 RX ORDER — SERTRALINE HYDROCHLORIDE 50 MG/1
50 TABLET, FILM COATED ORAL
Refills: 0 | Status: ACTIVE | COMMUNITY

## 2023-10-17 RX ORDER — METOPROLOL SUCCINATE 25 MG/1
25 TABLET, EXTENDED RELEASE ORAL
Refills: 0 | Status: ACTIVE | COMMUNITY

## 2023-10-17 RX ORDER — PREGABALIN 100 MG/1
100 CAPSULE ORAL
Refills: 0 | Status: ACTIVE | COMMUNITY

## 2023-10-17 RX ORDER — OXYCODONE HYDROCHLORIDE 10 MG/1
10 TABLET, EXTENDED RELEASE ORAL
Refills: 0 | Status: ACTIVE | COMMUNITY

## 2023-10-17 RX ORDER — PNV NO.95/FERROUS FUM/FOLIC AC 28MG-0.8MG
TABLET ORAL
Refills: 0 | Status: ACTIVE | COMMUNITY

## 2023-10-17 RX ORDER — IRON/IRON ASP GLY/FA/MV-MIN 38 125-25-1MG
TABLET ORAL
Refills: 0 | Status: ACTIVE | COMMUNITY

## 2023-10-23 ENCOUNTER — APPOINTMENT (OUTPATIENT)
Dept: NEUROLOGY | Facility: CLINIC | Age: 63
End: 2023-10-23
Payer: MEDICARE

## 2023-10-23 PROCEDURE — 95819 EEG AWAKE AND ASLEEP: CPT

## 2023-10-23 PROCEDURE — 93040 RHYTHM ECG WITH REPORT: CPT

## 2023-10-30 ENCOUNTER — APPOINTMENT (OUTPATIENT)
Dept: MRI IMAGING | Facility: CLINIC | Age: 63
End: 2023-10-30
Payer: MEDICARE

## 2023-10-30 ENCOUNTER — OUTPATIENT (OUTPATIENT)
Dept: OUTPATIENT SERVICES | Facility: HOSPITAL | Age: 63
LOS: 1 days | End: 2023-10-30
Payer: MEDICARE

## 2023-10-30 ENCOUNTER — NON-APPOINTMENT (OUTPATIENT)
Age: 63
End: 2023-10-30

## 2023-10-30 DIAGNOSIS — Z98.89 OTHER SPECIFIED POSTPROCEDURAL STATES: Chronic | ICD-10-CM

## 2023-10-30 DIAGNOSIS — Z00.8 ENCOUNTER FOR OTHER GENERAL EXAMINATION: ICD-10-CM

## 2023-10-30 DIAGNOSIS — R55 SYNCOPE AND COLLAPSE: ICD-10-CM

## 2023-10-30 DIAGNOSIS — R42 DIZZINESS AND GIDDINESS: ICD-10-CM

## 2023-10-30 DIAGNOSIS — Z98.84 BARIATRIC SURGERY STATUS: Chronic | ICD-10-CM

## 2023-10-30 PROCEDURE — 70553 MRI BRAIN STEM W/O & W/DYE: CPT | Mod: 26

## 2023-10-30 PROCEDURE — 70553 MRI BRAIN STEM W/O & W/DYE: CPT

## 2023-10-30 PROCEDURE — A9585: CPT

## 2024-04-16 NOTE — ED ADULT NURSE NOTE - CAS DISCH TRANSFER METHOD
Spoke with Adriana. States last year they could not make appt with Rheumatologist due to then not accepting new pts. States they would like to try someone else. Advised it is best to contact her insurance to see who is accepting new pts at this time.    Private car

## 2024-07-03 ENCOUNTER — APPOINTMENT (OUTPATIENT)
Dept: GASTROENTEROLOGY | Facility: CLINIC | Age: 64
End: 2024-07-03
Payer: MEDICARE

## 2024-07-03 VITALS
HEART RATE: 72 BPM | RESPIRATION RATE: 16 BRPM | HEIGHT: 63 IN | WEIGHT: 169 LBS | DIASTOLIC BLOOD PRESSURE: 72 MMHG | BODY MASS INDEX: 29.95 KG/M2 | SYSTOLIC BLOOD PRESSURE: 128 MMHG | OXYGEN SATURATION: 98 %

## 2024-07-03 DIAGNOSIS — Z12.11 ENCOUNTER FOR SCREENING FOR MALIGNANT NEOPLASM OF COLON: ICD-10-CM

## 2024-07-03 DIAGNOSIS — Z76.0 ENCOUNTER FOR ISSUE OF REPEAT PRESCRIPTION: ICD-10-CM

## 2024-07-03 PROCEDURE — 99214 OFFICE O/P EST MOD 30 MIN: CPT

## 2024-07-03 RX ORDER — PANTOPRAZOLE 40 MG/1
40 TABLET, DELAYED RELEASE ORAL DAILY
Qty: 90 | Refills: 1 | Status: ACTIVE | COMMUNITY
Start: 2024-07-03 | End: 1900-01-01

## 2024-07-03 RX ORDER — FAMOTIDINE 40 MG/1
40 TABLET, FILM COATED ORAL DAILY
Qty: 90 | Refills: 1 | Status: ACTIVE | COMMUNITY
Start: 2024-07-03 | End: 1900-01-01

## 2024-08-16 ENCOUNTER — OFFICE (OUTPATIENT)
Dept: URBAN - METROPOLITAN AREA CLINIC 94 | Facility: CLINIC | Age: 64
Setting detail: OPHTHALMOLOGY
End: 2024-08-16
Payer: COMMERCIAL

## 2024-08-16 DIAGNOSIS — E11.3293: ICD-10-CM

## 2024-08-16 DIAGNOSIS — H40.1132: ICD-10-CM

## 2024-08-16 PROBLEM — H11.153 PINGUECULA; BOTH EYES: Status: ACTIVE | Noted: 2024-08-16

## 2024-08-16 PROCEDURE — 92012 INTRM OPH EXAM EST PATIENT: CPT | Performed by: OPHTHALMOLOGY

## 2024-08-16 PROCEDURE — 92133 CPTRZD OPH DX IMG PST SGM ON: CPT | Performed by: OPHTHALMOLOGY

## 2024-08-16 ASSESSMENT — CONFRONTATIONAL VISUAL FIELD TEST (CVF)
OS_FINDINGS: FULL
OD_FINDINGS: FULL

## 2024-08-21 ENCOUNTER — APPOINTMENT (OUTPATIENT)
Dept: GASTROENTEROLOGY | Facility: CLINIC | Age: 64
End: 2024-08-21
Payer: MEDICARE

## 2024-08-21 VITALS
RESPIRATION RATE: 14 BRPM | HEIGHT: 63 IN | WEIGHT: 172 LBS | SYSTOLIC BLOOD PRESSURE: 113 MMHG | DIASTOLIC BLOOD PRESSURE: 82 MMHG | BODY MASS INDEX: 30.48 KG/M2 | HEART RATE: 62 BPM | OXYGEN SATURATION: 98 %

## 2024-08-21 DIAGNOSIS — R10.9 UNSPECIFIED ABDOMINAL PAIN: ICD-10-CM

## 2024-08-21 PROCEDURE — 99214 OFFICE O/P EST MOD 30 MIN: CPT

## 2024-08-21 NOTE — REASON FOR VISIT
No [Follow-up] : a follow-up of an existing diagnosis [FreeTextEntry1] : GERD, bloating, colon cancer screening

## 2024-08-21 NOTE — ASSESSMENT
[FreeTextEntry1] : A/P Patient with GERD, new onset pleural Will give pantoprazole 40 mg daily Will try lactose-free EGD to be performed-will biopsy for H. pylori and celiac   I discussed the risks and benefits of EGD and patient was given opportunity to ask questions. EGD to r/o Dubois's  esophagus, PUD, mass, AVM'S. Pt is medically optimized for EGD  Today's instructions for acid reflux include avoid provocative foods. For example citrus alcohol coffee chocolate mints. Smaller meals, no eating 3 hours prior to bedtime and elevate head of the bed prior to sleep.  Screening colonoscopy  I discussed the risks and benefits of colonoscopy and patient was given opportunity to ask questions. Colonoscopy to r/o colon cancer, polyps, AVM's. Patient is medically optimized for the procedure MiraLAX prep

## 2024-08-21 NOTE — REVIEW OF SYSTEMS
[Heartburn] : heartburn [Bloating (gassiness)] : bloating [Negative] : Psychiatric [FreeTextEntry7] : Bloating

## 2024-08-21 NOTE — HISTORY OF PRESENT ILLNESS
[FreeTextEntry1] : The patient has history of high cholesterol osteoarthritis COPD diabetes hypertension  Patient with a history of GERD x 12 years.  Symptoms were occurring 5 out of 7 days of the week.  She has had a history of a Graeme-en-Y bypass.  Last visit we tried to transition her from pantoprazole daily to Pepcid 40 mg daily however her symptoms of heartburn and regurgitation resumed.  She is also complaining of new bloating.  No improvement with Gas-X.  Bloating is occurring a few times a week.  She has EGD and colonoscopy scheduled 9/13  Patient also due for screening colonoscopy.  She has no constipation diarrhea black stools bloody stools.  No family history of colon cancer or colon polyps.  No personal history of colon cancer colon polyps.

## 2024-09-12 ENCOUNTER — TRANSCRIPTION ENCOUNTER (OUTPATIENT)
Age: 64
End: 2024-09-12

## 2024-09-13 ENCOUNTER — RESULT REVIEW (OUTPATIENT)
Age: 64
End: 2024-09-13

## 2024-09-13 ENCOUNTER — OUTPATIENT (OUTPATIENT)
Dept: OUTPATIENT SERVICES | Facility: HOSPITAL | Age: 64
LOS: 1 days | End: 2024-09-13
Payer: MEDICARE

## 2024-09-13 ENCOUNTER — APPOINTMENT (OUTPATIENT)
Dept: GASTROENTEROLOGY | Facility: GI CENTER | Age: 64
End: 2024-09-13
Payer: MEDICARE

## 2024-09-13 DIAGNOSIS — Z12.11 ENCOUNTER FOR SCREENING FOR MALIGNANT NEOPLASM OF COLON: ICD-10-CM

## 2024-09-13 DIAGNOSIS — Z98.1 ARTHRODESIS STATUS: Chronic | ICD-10-CM

## 2024-09-13 DIAGNOSIS — Z98.89 OTHER SPECIFIED POSTPROCEDURAL STATES: Chronic | ICD-10-CM

## 2024-09-13 DIAGNOSIS — K21.9 GASTRO-ESOPHAGEAL REFLUX DISEASE WITHOUT ESOPHAGITIS: ICD-10-CM

## 2024-09-13 DIAGNOSIS — Z98.84 BARIATRIC SURGERY STATUS: Chronic | ICD-10-CM

## 2024-09-13 PROCEDURE — 88342 IMHCHEM/IMCYTCHM 1ST ANTB: CPT

## 2024-09-13 PROCEDURE — 43239 EGD BIOPSY SINGLE/MULTIPLE: CPT

## 2024-09-13 PROCEDURE — 45378 DIAGNOSTIC COLONOSCOPY: CPT | Mod: PT

## 2024-09-13 PROCEDURE — 88342 IMHCHEM/IMCYTCHM 1ST ANTB: CPT | Mod: 26

## 2024-09-13 PROCEDURE — 43239 EGD BIOPSY SINGLE/MULTIPLE: CPT | Mod: 59

## 2024-09-13 PROCEDURE — 88305 TISSUE EXAM BY PATHOLOGIST: CPT | Mod: 26

## 2024-09-13 PROCEDURE — G0121: CPT

## 2024-09-13 PROCEDURE — 88305 TISSUE EXAM BY PATHOLOGIST: CPT

## 2024-09-13 NOTE — REASON FOR VISIT
[Follow-up] : a follow-up of an existing diagnosis [FreeTextEntry1] : gerd, bloating , screening colon

## 2024-09-13 NOTE — ASSESSMENT
[FreeTextEntry1] : A/P gerd Today's instructions for acid reflux include avoid provocative foods. For example citrus alcohol coffee chocolate mints. Smaller meals, no eating 3 hours prior to bedtime and elevate head of the bed prior to sleep.   I discussed the risks and benefits of EGD and patient was given opportunity to ask questions. EGD to r/o Dubois's  esophagus, PUD, mass, AVM'S. Pt is medically optimized for EGD   screening colon  I discussed the risks and benefits of colonoscopy and patient was given opportunity to ask questions. Colonoscopy to r/o colon cancer, polyps, AVM's. Patient is medically optimized for the procedure

## 2024-09-17 LAB — SURGICAL PATHOLOGY STUDY: SIGNIFICANT CHANGE UP

## 2024-10-17 ENCOUNTER — EMERGENCY (EMERGENCY)
Facility: HOSPITAL | Age: 64
LOS: 1 days | Discharge: HOME | End: 2024-10-17
Attending: STUDENT IN AN ORGANIZED HEALTH CARE EDUCATION/TRAINING PROGRAM
Payer: MEDICARE

## 2024-10-17 VITALS
TEMPERATURE: 99 F | RESPIRATION RATE: 18 BRPM | SYSTOLIC BLOOD PRESSURE: 161 MMHG | DIASTOLIC BLOOD PRESSURE: 70 MMHG | HEART RATE: 54 BPM | OXYGEN SATURATION: 100 %

## 2024-10-17 VITALS
DIASTOLIC BLOOD PRESSURE: 61 MMHG | SYSTOLIC BLOOD PRESSURE: 98 MMHG | HEART RATE: 49 BPM | TEMPERATURE: 98 F | RESPIRATION RATE: 18 BRPM | OXYGEN SATURATION: 98 % | WEIGHT: 160.06 LBS

## 2024-10-17 DIAGNOSIS — Z98.89 OTHER SPECIFIED POSTPROCEDURAL STATES: Chronic | ICD-10-CM

## 2024-10-17 DIAGNOSIS — Z98.84 BARIATRIC SURGERY STATUS: Chronic | ICD-10-CM

## 2024-10-17 DIAGNOSIS — Z98.1 ARTHRODESIS STATUS: Chronic | ICD-10-CM

## 2024-10-17 PROCEDURE — 70450 CT HEAD/BRAIN W/O DYE: CPT | Mod: 26,MC

## 2024-10-17 PROCEDURE — 72125 CT NECK SPINE W/O DYE: CPT | Mod: 26,MC

## 2024-10-17 PROCEDURE — 73562 X-RAY EXAM OF KNEE 3: CPT | Mod: 26,RT

## 2024-10-17 PROCEDURE — 99284 EMERGENCY DEPT VISIT MOD MDM: CPT | Mod: 25

## 2024-10-17 PROCEDURE — 70450 CT HEAD/BRAIN W/O DYE: CPT | Mod: MC

## 2024-10-17 PROCEDURE — 73502 X-RAY EXAM HIP UNI 2-3 VIEWS: CPT | Mod: 26,RT

## 2024-10-17 PROCEDURE — 73562 X-RAY EXAM OF KNEE 3: CPT

## 2024-10-17 PROCEDURE — T1013: CPT

## 2024-10-17 PROCEDURE — 99285 EMERGENCY DEPT VISIT HI MDM: CPT

## 2024-10-17 PROCEDURE — 73502 X-RAY EXAM HIP UNI 2-3 VIEWS: CPT

## 2024-10-17 PROCEDURE — 72125 CT NECK SPINE W/O DYE: CPT | Mod: MC

## 2024-10-17 RX ORDER — ACETAMINOPHEN 325 MG
650 TABLET ORAL ONCE
Refills: 0 | Status: COMPLETED | OUTPATIENT
Start: 2024-10-17 | End: 2024-10-17

## 2024-10-17 RX ADMIN — Medication 600 MILLIGRAM(S): at 23:59

## 2024-10-17 RX ADMIN — Medication 650 MILLIGRAM(S): at 22:52

## 2024-10-17 NOTE — ED ADULT TRIAGE NOTE - CHIEF COMPLAINT QUOTE
pt c/o right knee pain post fall earlier this afternoon c/o hitting on floor, denies any LOC. not on blood thinner pt states her speech was off post fall, denies any HA, dizziness no slurred speech at this time hx HTN none

## 2024-10-17 NOTE — ED ADULT TRIAGE NOTE - WEIGHT IN KG
Problem: Infection:  Goal: Will remain free from infection  Description: Will remain free from infection  Outcome: Ongoing     Problem: Safety:  Goal: Free from accidental physical injury  Description: Free from accidental physical injury  Outcome: Ongoing  Goal: Free from intentional harm  Description: Free from intentional harm  Outcome: Ongoing     Problem: Daily Care:  Goal: Daily care needs are met  Description: Daily care needs are met  Outcome: Ongoing     Problem: Pain:  Goal: Patient's pain/discomfort is manageable  Description: Patient's pain/discomfort is manageable  Outcome: Ongoing     Problem: Skin Integrity:  Goal: Skin integrity will stabilize  Description: Skin integrity will stabilize  Outcome: Ongoing     Problem: OXYGENATION/RESPIRATORY FUNCTION  Goal: Patient will maintain patent airway  3/27/2020 0901 by Jena Harley RN  Outcome: Ongoing  3/27/2020 0838 by Yunier Montenegro RCP  Outcome: Ongoing  3/26/2020 2017 by Francisco Javier Hernandez RCP  Outcome: Ongoing  Goal: Patient will achieve/maintain normal respiratory rate/effort  Description: Respiratory rate and effort will be within normal limits for the patient  3/27/2020 0901 by Jena Harley RN  Outcome: Ongoing  3/27/2020 0838 by Yunier Montenegro RCP  Outcome: Ongoing  3/26/2020 2017 by Francisco Javier Hernandez RCP  Outcome: Ongoing     Problem: MECHANICAL VENTILATION  Goal: Patient will maintain patent airway  3/27/2020 0901 by Jena Harley RN  Outcome: Ongoing  3/27/2020 0838 by Yunier Montenegro RCP  Outcome: Ongoing  3/26/2020 2017 by Francisco Javier Hernandez RCP  Outcome: Ongoing  Goal: Oral health is maintained or improved  3/27/2020 0901 by Jena Harley RN  Outcome: Ongoing  3/27/2020 0838 by Yunier Montenegro RCP  Outcome: Ongoing  3/26/2020 2017 by Francisco Javier Hernandez RCP  Outcome: Ongoing  Goal: Tracheostomy will be managed safely  3/27/2020 0901 by Jena Harley RN  Outcome: Ongoing  3/27/2020 0838 by Yunier Montenegro 72.6

## 2024-10-17 NOTE — ED PROVIDER NOTE - PHYSICAL EXAMINATION
VITALS: reviewed  GEN: No apparent distress, A & O x 4  HEAD/EYES: NC/AT, PERRL, EOMI, anicteric sclerae, no conjunctival pallor  ENT: mucus membranes moist, trachea midline, no JVD, neck is supple  RESP: lungs CTA with equal breath sounds bilaterally, chest wall nontender and atraumatic  CV: heart with reg rhythm S1, S2, no murmur; distal pulses intact and symmetric bilaterally  ABDOMEN: normoactive bowel sounds, soft, nondistended, nontender, no palpable masses  : no CVAT  MSK: +TTP on R knee, no deformities, no bruise, no edema, CRT<2s. Motor/ sensory intact.  There is no spinal deformity or stepoff and the back is ranged painlessly. the neck has mild tenderness mid spine, no midline tenderness, deformity, or stepoff.  SKIN: warm, dry, no rash, no bruising, no cyanosis.  NEURO: alert, mentating appropriately, no facial asymmetry. gross sensation, motor, coordination are intact  PSYCH: Affect appropriate

## 2024-10-17 NOTE — ED ADULT NURSE NOTE - OBJECTIVE STATEMENT
pt c/o right knee pain post fall earlier this afternoon c/o hitting on floor, denies any LOC. not on blood thinner pt states her speech was off post fall, denies any HA, dizziness no slurred speech at this time hx HTN

## 2024-10-17 NOTE — ED PROVIDER NOTE - ATTENDING CONTRIBUTION TO CARE
63y F w/ hx OA, HTN, HLD, chronic back pain, chronic cervical pain, spinal stenosis, GERD; presents for fall. Pt says her right knee gave out while walking and she fell backwards. Thinks she may have hit her head. Complaining of right knee and neck pain. No blood thinners. Has chronic pain of neck and right knee for which she follows with ortho. On my exam pt in no distress, AAOx4. +Mild diffuse tenderness to b/l paracervical area, no stepoff. +Tenderness to right knee with no obvious deformity, +limited ROM 2/2 pain. No acute findings on CT. XRs showing arthritic changes, no acute fracture as interpreted independently by me. Pt ambulatory with cane in the ED. Provided with Ace bandage for right knee. Feels comfortable going home. Advised to f/u with ortho. Stable for discharge.

## 2024-10-17 NOTE — ED PROVIDER NOTE - OBJECTIVE STATEMENT
A 62 yo F with hx OA, HTN, HLD, chronic back pain, chronic cervical pain, spinal stenosis, GERD, presents to the ED for R knee cap pain s/p mechanical fall this afternoon. Denies LOC, but reports not sure if pt had head strike. Also admits neck pain. Denies fevers, chills, headache, chest pain, palpitations, shortness of breath, nausea, vomiting, diarrhea, dysuria, dark stools, focal neurologic symptoms.

## 2024-10-17 NOTE — ED PROVIDER NOTE - NSFOLLOWUPINSTRUCTIONS_ED_ALL_ED_FT
Please follow-up with your primary care doctor.  Please call for an appointment in the next 48 hours but if you cannot follow-up with your primary care doctor please return to the Emergency Department for any urgent issues.    You were given a copy of the tests performed today.  Please bring the results with you and review them with your primary care doctor.    If you have any worsening of symptoms or any other concerns please return to the Emergency Department immediately.    Please continue taking your home medications as directed. Please follow-up with your primary care doctor and orthopedic surgeon.  Please call for an appointment in the next 48 hours but if you cannot follow-up with your primary care doctor please return to the Emergency Department for any urgent issues.    You were given a copy of the tests performed today.  Please bring the results with you and review them with your primary care doctor.    If you have any worsening of symptoms or any other concerns please return to the Emergency Department immediately.    Please continue taking your home medications as directed.

## 2024-10-17 NOTE — ED PROVIDER NOTE - PATIENT PORTAL LINK FT
You can access the FollowMyHealth Patient Portal offered by [facility] by registering at the following website: [email or web address]. By joining [employer]’s FollowMyHealth portal, you will also be able to view your health information using other applications (apps) compatible with our system.

## 2024-10-17 NOTE — ED PROVIDER NOTE - NSICDXPASTMEDICALHX_GEN_ALL_CORE_FT
PAST MEDICAL HISTORY:  Arthritis     Bronchitis     Dyslipidemia (high LDL; low HDL)     Genital herpes     GERD (gastroesophageal reflux disease)     Hypertension     OA (osteoarthritis)     Spinal stenosis

## 2024-11-22 ENCOUNTER — APPOINTMENT (OUTPATIENT)
Dept: GASTROENTEROLOGY | Facility: CLINIC | Age: 64
End: 2024-11-22
Payer: MEDICARE

## 2024-11-22 VITALS
BODY MASS INDEX: 30.3 KG/M2 | DIASTOLIC BLOOD PRESSURE: 70 MMHG | WEIGHT: 171 LBS | HEART RATE: 69 BPM | SYSTOLIC BLOOD PRESSURE: 104 MMHG | HEIGHT: 63 IN

## 2024-11-22 PROCEDURE — 99214 OFFICE O/P EST MOD 30 MIN: CPT

## 2024-12-20 ENCOUNTER — APPOINTMENT (OUTPATIENT)
Dept: ORTHOPEDIC SURGERY | Facility: CLINIC | Age: 64
End: 2024-12-20
Payer: MEDICARE

## 2024-12-20 VITALS — BODY MASS INDEX: 26.58 KG/M2 | WEIGHT: 150 LBS | HEIGHT: 63 IN

## 2024-12-20 DIAGNOSIS — M17.11 UNILATERAL PRIMARY OSTEOARTHRITIS, RIGHT KNEE: ICD-10-CM

## 2024-12-20 PROCEDURE — 99214 OFFICE O/P EST MOD 30 MIN: CPT

## 2024-12-20 PROCEDURE — 73562 X-RAY EXAM OF KNEE 3: CPT | Mod: RT

## 2024-12-31 ENCOUNTER — NON-APPOINTMENT (OUTPATIENT)
Age: 64
End: 2024-12-31

## 2025-01-03 ENCOUNTER — RX RENEWAL (OUTPATIENT)
Age: 65
End: 2025-01-03

## 2025-01-23 ENCOUNTER — OUTPATIENT (OUTPATIENT)
Dept: OUTPATIENT SERVICES | Facility: HOSPITAL | Age: 65
LOS: 1 days | End: 2025-01-23
Payer: MEDICARE

## 2025-01-23 VITALS
HEIGHT: 62 IN | DIASTOLIC BLOOD PRESSURE: 68 MMHG | SYSTOLIC BLOOD PRESSURE: 110 MMHG | OXYGEN SATURATION: 98 % | WEIGHT: 164.02 LBS | HEART RATE: 78 BPM | RESPIRATION RATE: 18 BRPM | TEMPERATURE: 99 F

## 2025-01-23 DIAGNOSIS — Z98.1 ARTHRODESIS STATUS: Chronic | ICD-10-CM

## 2025-01-23 DIAGNOSIS — Z01.818 ENCOUNTER FOR OTHER PREPROCEDURAL EXAMINATION: ICD-10-CM

## 2025-01-23 DIAGNOSIS — Z98.89 OTHER SPECIFIED POSTPROCEDURAL STATES: Chronic | ICD-10-CM

## 2025-01-23 DIAGNOSIS — Z98.84 BARIATRIC SURGERY STATUS: Chronic | ICD-10-CM

## 2025-01-23 DIAGNOSIS — M17.11 UNILATERAL PRIMARY OSTEOARTHRITIS, RIGHT KNEE: ICD-10-CM

## 2025-01-23 LAB
A1C WITH ESTIMATED AVERAGE GLUCOSE RESULT: 6.4 % — HIGH (ref 4–5.6)
ALBUMIN SERPL ELPH-MCNC: 4 G/DL — SIGNIFICANT CHANGE UP (ref 3.3–5)
ALP SERPL-CCNC: 84 U/L — SIGNIFICANT CHANGE UP (ref 30–120)
ALT FLD-CCNC: 19 U/L — SIGNIFICANT CHANGE UP (ref 10–60)
ANION GAP SERPL CALC-SCNC: 12 MMOL/L — SIGNIFICANT CHANGE UP (ref 5–17)
APTT BLD: 25.9 SEC — SIGNIFICANT CHANGE UP (ref 24.5–35.6)
AST SERPL-CCNC: 13 U/L — SIGNIFICANT CHANGE UP (ref 10–40)
BILIRUB SERPL-MCNC: 0.4 MG/DL — SIGNIFICANT CHANGE UP (ref 0.2–1.2)
BLD GP AB SCN SERPL QL: SIGNIFICANT CHANGE UP
BUN SERPL-MCNC: 24 MG/DL — HIGH (ref 7–23)
CALCIUM SERPL-MCNC: 10 MG/DL — SIGNIFICANT CHANGE UP (ref 8.4–10.5)
CHLORIDE SERPL-SCNC: 103 MMOL/L — SIGNIFICANT CHANGE UP (ref 96–108)
CO2 SERPL-SCNC: 26 MMOL/L — SIGNIFICANT CHANGE UP (ref 22–31)
CREAT SERPL-MCNC: 1.41 MG/DL — HIGH (ref 0.5–1.3)
EGFR: 42 ML/MIN/1.73M2 — LOW
ESTIMATED AVERAGE GLUCOSE: 137 MG/DL — HIGH (ref 68–114)
GLUCOSE SERPL-MCNC: 117 MG/DL — HIGH (ref 70–99)
HCT VFR BLD CALC: 39.4 % — SIGNIFICANT CHANGE UP (ref 34.5–45)
HGB BLD-MCNC: 12.8 G/DL — SIGNIFICANT CHANGE UP (ref 11.5–15.5)
INR BLD: 0.92 RATIO — SIGNIFICANT CHANGE UP (ref 0.85–1.16)
MCHC RBC-ENTMCNC: 29.1 PG — SIGNIFICANT CHANGE UP (ref 27–34)
MCHC RBC-ENTMCNC: 32.5 G/DL — SIGNIFICANT CHANGE UP (ref 32–36)
MCV RBC AUTO: 89.5 FL — SIGNIFICANT CHANGE UP (ref 80–100)
MRSA PCR RESULT.: SIGNIFICANT CHANGE UP
NRBC # BLD: 0 /100 WBCS — SIGNIFICANT CHANGE UP (ref 0–0)
NRBC BLD-RTO: 0 /100 WBCS — SIGNIFICANT CHANGE UP (ref 0–0)
PLATELET # BLD AUTO: 156 K/UL — SIGNIFICANT CHANGE UP (ref 150–400)
POTASSIUM SERPL-MCNC: 3.4 MMOL/L — LOW (ref 3.5–5.3)
POTASSIUM SERPL-SCNC: 3.4 MMOL/L — LOW (ref 3.5–5.3)
PROT SERPL-MCNC: 7.7 G/DL — SIGNIFICANT CHANGE UP (ref 6–8.3)
PROTHROM AB SERPL-ACNC: 10.9 SEC — SIGNIFICANT CHANGE UP (ref 9.9–13.4)
RBC # BLD: 4.4 M/UL — SIGNIFICANT CHANGE UP (ref 3.8–5.2)
RBC # FLD: 12.9 % — SIGNIFICANT CHANGE UP (ref 10.3–14.5)
S AUREUS DNA NOSE QL NAA+PROBE: SIGNIFICANT CHANGE UP
SODIUM SERPL-SCNC: 141 MMOL/L — SIGNIFICANT CHANGE UP (ref 135–145)
WBC # BLD: 5.66 K/UL — SIGNIFICANT CHANGE UP (ref 3.8–10.5)
WBC # FLD AUTO: 5.66 K/UL — SIGNIFICANT CHANGE UP (ref 3.8–10.5)

## 2025-01-23 PROCEDURE — 86900 BLOOD TYPING SEROLOGIC ABO: CPT

## 2025-01-23 PROCEDURE — 85610 PROTHROMBIN TIME: CPT

## 2025-01-23 PROCEDURE — 80053 COMPREHEN METABOLIC PANEL: CPT

## 2025-01-23 PROCEDURE — G0463: CPT

## 2025-01-23 PROCEDURE — 36415 COLL VENOUS BLD VENIPUNCTURE: CPT

## 2025-01-23 PROCEDURE — 87641 MR-STAPH DNA AMP PROBE: CPT

## 2025-01-23 PROCEDURE — 86901 BLOOD TYPING SEROLOGIC RH(D): CPT

## 2025-01-23 PROCEDURE — 93005 ELECTROCARDIOGRAM TRACING: CPT

## 2025-01-23 PROCEDURE — 86850 RBC ANTIBODY SCREEN: CPT

## 2025-01-23 PROCEDURE — 85027 COMPLETE CBC AUTOMATED: CPT

## 2025-01-23 PROCEDURE — 87640 STAPH A DNA AMP PROBE: CPT

## 2025-01-23 PROCEDURE — 93010 ELECTROCARDIOGRAM REPORT: CPT

## 2025-01-23 PROCEDURE — 85730 THROMBOPLASTIN TIME PARTIAL: CPT

## 2025-01-23 PROCEDURE — 83036 HEMOGLOBIN GLYCOSYLATED A1C: CPT

## 2025-01-23 NOTE — H&P PST ADULT - PAIN ASSESSMENT COMPLETED
Sw delivered rolling walker to pt's room from [address]. Pt expressed anticipation to return home. No further needs are noted at this time. Yes

## 2025-01-23 NOTE — H&P PST ADULT - HISTORY OF PRESENT ILLNESS
This is a 64 year old female with PMH of OA, GERD, HTN, HDL, Spinal Stenosis presents to PST complaining of right knee pain x5 years. She has a long-standing history of right knee osteoarthritis and has had conservative treatment with injections physical therapy anti-inflammatory medication. She currently goes to pain management. Her pain today is 10/10 and she appears uncomfortable. States that her right knee has weakness and jose when she is standing for a long period. Patient is scheduled for Right total knee replacement

## 2025-01-23 NOTE — H&P PST ADULT - MUSCULOSKELETAL
ROM intact/decreased ROM due to pain/joint swelling/normal gait/strength 5/5 bilateral upper extremities/strength 5/5 bilateral lower extremities details…

## 2025-01-23 NOTE — H&P PST ADULT - LYMPHATIC
Message received from pt  Thank you for reaching back out to me.  Been a little hectic with appointments.  Yes I will be following up with pain management, but don't see them until February, so if the 3 month supply could be sent to [facility] pharmacy in [place].  Thank you, Adolfo Ballesteros    Pt has appointment with Renetta Hancock 2/14/23 .. will Renetta refill Gabapentin for pt?    No lymphadedenopathy

## 2025-01-23 NOTE — H&P PST ADULT - COMMENTS
denies broken loose teeth, denies dentures  denies h/oDVT,PE  denies any current cold or flu like symptoms, including fever, cough, sinus congestion, body aches or chills 2024 denies broken loose teeth,   denies h/oDVT,PE  denies any current cold or flu like symptoms, including fever, cough, sinus congestion, body aches or chills

## 2025-01-23 NOTE — H&P PST ADULT - NSICDXPASTMEDICALHX_GEN_ALL_CORE_FT
PAST MEDICAL HISTORY:  Arthritis     Bronchitis     Cataract     Dyslipidemia (high LDL; low HDL)     Genital herpes     GERD (gastroesophageal reflux disease)     Glaucoma     Hypertension     Irregular heart rate     OA (osteoarthritis)     Osteoarthritis of right knee     Spinal stenosis

## 2025-01-23 NOTE — H&P PST ADULT - PROBLEM SELECTOR PLAN 1
She is scheduled for Total Right Knee with MD Vazquez on 02/10/2025  Medical Clearance   Cardiac Clearance  Patient provided with pre-operative instructions and verbalized understanding.    Patient will be NPO on day of surgery.   Patient will stop NSAIDs, aspirin, herbal supplements or vitamins 1 week prior to surgery.    Chlorhexidine wash  and Gatorade provided with instructions.

## 2025-01-23 NOTE — H&P PST ADULT - NSICDXPASTSURGICALHX_GEN_ALL_CORE_FT
PAST SURGICAL HISTORY:  History of carpal tunnel release on left wrist 2012    S/P bariatric surgery 2003    S/P  x 2    S/P cervical spinal fusion     S/P lumbar spinal fusion     S/P shoulder surgery Left (  )  & Joint resection in 2009

## 2025-02-06 PROBLEM — H40.9 UNSPECIFIED GLAUCOMA: Chronic | Status: ACTIVE | Noted: 2025-01-23

## 2025-02-06 PROBLEM — I49.9 CARDIAC ARRHYTHMIA, UNSPECIFIED: Chronic | Status: ACTIVE | Noted: 2025-01-23

## 2025-02-06 PROBLEM — M17.11 UNILATERAL PRIMARY OSTEOARTHRITIS, RIGHT KNEE: Chronic | Status: ACTIVE | Noted: 2025-01-23

## 2025-02-06 PROBLEM — H26.9 UNSPECIFIED CATARACT: Chronic | Status: ACTIVE | Noted: 2025-01-23

## 2025-02-07 RX ORDER — APREPITANT 40 MG/1
40 CAPSULE ORAL ONCE
Refills: 0 | Status: COMPLETED | OUTPATIENT
Start: 2025-02-10 | End: 2025-02-10

## 2025-02-07 RX ORDER — TRANEXAMIC ACID 100 MG/ML
740 INJECTION, SOLUTION INTRAVENOUS ONCE
Refills: 0 | Status: COMPLETED | OUTPATIENT
Start: 2025-02-10 | End: 2025-02-10

## 2025-02-07 RX ORDER — ANTISEPTIC SURGICAL SCRUB 0.04 MG/ML
1 SOLUTION TOPICAL ONCE
Refills: 0 | Status: COMPLETED | OUTPATIENT
Start: 2025-02-10 | End: 2025-02-10

## 2025-02-07 RX ORDER — ACETAMINOPHEN 160 MG/5ML
1000 SUSPENSION ORAL ONCE
Refills: 0 | Status: COMPLETED | OUTPATIENT
Start: 2025-02-10 | End: 2025-02-10

## 2025-02-07 RX ORDER — CEFAZOLIN SODIUM IN 0.9 % NACL 2 G/10 ML
2000 SYRINGE (ML) INTRAVENOUS ONCE
Refills: 0 | Status: COMPLETED | OUTPATIENT
Start: 2025-02-10 | End: 2025-02-10

## 2025-02-10 ENCOUNTER — TRANSCRIPTION ENCOUNTER (OUTPATIENT)
Age: 65
End: 2025-02-10

## 2025-02-10 ENCOUNTER — INPATIENT (INPATIENT)
Facility: HOSPITAL | Age: 65
LOS: 3 days | Discharge: HOME | DRG: 554 | End: 2025-02-14
Attending: ORTHOPAEDIC SURGERY | Admitting: ORTHOPAEDIC SURGERY
Payer: MEDICARE

## 2025-02-10 ENCOUNTER — APPOINTMENT (OUTPATIENT)
Dept: ORTHOPEDIC SURGERY | Facility: HOSPITAL | Age: 65
End: 2025-02-10

## 2025-02-10 VITALS
WEIGHT: 164.91 LBS | HEART RATE: 60 BPM | DIASTOLIC BLOOD PRESSURE: 86 MMHG | SYSTOLIC BLOOD PRESSURE: 169 MMHG | RESPIRATION RATE: 15 BRPM | OXYGEN SATURATION: 100 % | TEMPERATURE: 97 F | HEIGHT: 63 IN

## 2025-02-10 DIAGNOSIS — M17.11 UNILATERAL PRIMARY OSTEOARTHRITIS, RIGHT KNEE: ICD-10-CM

## 2025-02-10 DIAGNOSIS — Z98.89 OTHER SPECIFIED POSTPROCEDURAL STATES: Chronic | ICD-10-CM

## 2025-02-10 DIAGNOSIS — Z98.1 ARTHRODESIS STATUS: Chronic | ICD-10-CM

## 2025-02-10 DIAGNOSIS — Z98.84 BARIATRIC SURGERY STATUS: Chronic | ICD-10-CM

## 2025-02-10 DEVICE — COMP PATELLA TRI-PEG E-PLUS POLY 8X32MM: Type: IMPLANTABLE DEVICE | Status: FUNCTIONAL

## 2025-02-10 DEVICE — COMP FEM NON POROUS SZ 4 RT: Type: IMPLANTABLE DEVICE | Status: FUNCTIONAL

## 2025-02-10 DEVICE — INSERT TIB NONPOROUS UNIV SZ 4 RT: Type: IMPLANTABLE DEVICE | Status: FUNCTIONAL

## 2025-02-10 DEVICE — IMPLANTABLE DEVICE: Type: IMPLANTABLE DEVICE | Status: FUNCTIONAL

## 2025-02-10 DEVICE — BONE WAX 2.5GM: Type: IMPLANTABLE DEVICE | Status: FUNCTIONAL

## 2025-02-10 RX ORDER — ONDANSETRON 4 MG/1
4 TABLET, ORALLY DISINTEGRATING ORAL EVERY 6 HOURS
Refills: 0 | Status: DISCONTINUED | OUTPATIENT
Start: 2025-02-10 | End: 2025-02-14

## 2025-02-10 RX ORDER — ACETAMINOPHEN 160 MG/5ML
1000 SUSPENSION ORAL EVERY 8 HOURS
Refills: 0 | Status: DISCONTINUED | OUTPATIENT
Start: 2025-02-10 | End: 2025-02-14

## 2025-02-10 RX ORDER — METOPROLOL SUCCINATE 25 MG
25 TABLET, EXTENDED RELEASE 24 HR ORAL DAILY
Refills: 0 | Status: DISCONTINUED | OUTPATIENT
Start: 2025-02-10 | End: 2025-02-11

## 2025-02-10 RX ORDER — SIMVASTATIN 20 MG
1 TABLET ORAL
Refills: 0 | DISCHARGE

## 2025-02-10 RX ORDER — DEXAMETHASONE SODIUM PHOSPHATE 4 MG/ML
8 INJECTION, SOLUTION INTRA-ARTICULAR; INTRALESIONAL; INTRAMUSCULAR; INTRAVENOUS; SOFT TISSUE ONCE
Refills: 0 | Status: COMPLETED | OUTPATIENT
Start: 2025-02-11 | End: 2025-02-11

## 2025-02-10 RX ORDER — POLYETHYLENE GLYCOL 3350 17 G/17G
17 POWDER, FOR SOLUTION ORAL AT BEDTIME
Refills: 0 | Status: DISCONTINUED | OUTPATIENT
Start: 2025-02-10 | End: 2025-02-14

## 2025-02-10 RX ORDER — ONDANSETRON 4 MG/1
4 TABLET, ORALLY DISINTEGRATING ORAL ONCE
Refills: 0 | Status: DISCONTINUED | OUTPATIENT
Start: 2025-02-10 | End: 2025-02-10

## 2025-02-10 RX ORDER — OXYCODONE HYDROCHLORIDE 30 MG/1
10 TABLET ORAL
Refills: 0 | Status: DISCONTINUED | OUTPATIENT
Start: 2025-02-10 | End: 2025-02-14

## 2025-02-10 RX ORDER — BACTERIOSTATIC SODIUM CHLORIDE 0.9 %
500 VIAL (ML) INJECTION ONCE
Refills: 0 | Status: COMPLETED | OUTPATIENT
Start: 2025-02-10 | End: 2025-02-10

## 2025-02-10 RX ORDER — PREGABALIN CAPSULES, CV 225 MG/1
100 CAPSULE ORAL
Refills: 0 | Status: DISCONTINUED | OUTPATIENT
Start: 2025-02-10 | End: 2025-02-14

## 2025-02-10 RX ORDER — VALACYCLOVIR 1000 MG/1
500 TABLET ORAL DAILY
Refills: 0 | Status: DISCONTINUED | OUTPATIENT
Start: 2025-02-10 | End: 2025-02-14

## 2025-02-10 RX ORDER — MAGNESIUM HYDROXIDE 400 MG/5ML
30 SUSPENSION, ORAL (FINAL DOSE FORM) ORAL DAILY
Refills: 0 | Status: DISCONTINUED | OUTPATIENT
Start: 2025-02-10 | End: 2025-02-14

## 2025-02-10 RX ORDER — HYDROMORPHONE HYDROCHLORIDE 4 MG/ML
0.5 INJECTION, SOLUTION INTRAMUSCULAR; INTRAVENOUS; SUBCUTANEOUS
Refills: 0 | Status: DISCONTINUED | OUTPATIENT
Start: 2025-02-10 | End: 2025-02-10

## 2025-02-10 RX ORDER — SODIUM CHLORIDE 9 G/ML
1000 INJECTION, SOLUTION INTRAVENOUS
Refills: 0 | Status: DISCONTINUED | OUTPATIENT
Start: 2025-02-10 | End: 2025-02-10

## 2025-02-10 RX ORDER — BISACODYL 5 MG
10 TABLET, DELAYED RELEASE (ENTERIC COATED) ORAL ONCE
Refills: 0 | Status: DISCONTINUED | OUTPATIENT
Start: 2025-02-12 | End: 2025-02-14

## 2025-02-10 RX ORDER — SENNOSIDES 8.6 MG
2 TABLET ORAL AT BEDTIME
Refills: 0 | Status: DISCONTINUED | OUTPATIENT
Start: 2025-02-10 | End: 2025-02-14

## 2025-02-10 RX ORDER — MAGNESIUM, ALUMINUM HYDROXIDE 200-225/5
30 SUSPENSION, ORAL (FINAL DOSE FORM) ORAL
Refills: 0 | Status: DISCONTINUED | OUTPATIENT
Start: 2025-02-10 | End: 2025-02-14

## 2025-02-10 RX ORDER — LOSARTAN POTASSIUM AND HYDROCHLOROTHIAZIDE 50; 12.5 MG/1; MG/1
1 TABLET, FILM COATED ORAL
Refills: 0 | DISCHARGE

## 2025-02-10 RX ORDER — PANTOPRAZOLE 20 MG/1
40 TABLET, DELAYED RELEASE ORAL
Refills: 0 | Status: DISCONTINUED | OUTPATIENT
Start: 2025-02-10 | End: 2025-02-14

## 2025-02-10 RX ORDER — TIZANIDINE HCL 4 MG
4 TABLET ORAL EVERY 8 HOURS
Refills: 0 | Status: DISCONTINUED | OUTPATIENT
Start: 2025-02-10 | End: 2025-02-14

## 2025-02-10 RX ORDER — SODIUM CHLORIDE 9 G/ML
1000 INJECTION, SOLUTION INTRAVENOUS
Refills: 0 | Status: DISCONTINUED | OUTPATIENT
Start: 2025-02-10 | End: 2025-02-11

## 2025-02-10 RX ORDER — HYDROMORPHONE HYDROCHLORIDE 4 MG/ML
0.5 INJECTION, SOLUTION INTRAMUSCULAR; INTRAVENOUS; SUBCUTANEOUS
Refills: 0 | Status: DISCONTINUED | OUTPATIENT
Start: 2025-02-10 | End: 2025-02-14

## 2025-02-10 RX ORDER — SODIUM CHLORIDE 9 G/ML
500 INJECTION, SOLUTION INTRAVENOUS ONCE
Refills: 0 | Status: COMPLETED | OUTPATIENT
Start: 2025-02-10 | End: 2025-02-10

## 2025-02-10 RX ORDER — CELECOXIB 200 MG
100 CAPSULE ORAL EVERY 12 HOURS
Refills: 0 | Status: DISCONTINUED | OUTPATIENT
Start: 2025-02-10 | End: 2025-02-14

## 2025-02-10 RX ORDER — IRON FUM,PS/FOLIC/BCOMP,C NO.9 125 MG-1MG
1 CAPSULE ORAL
Refills: 0 | DISCHARGE

## 2025-02-10 RX ORDER — ASPIRIN 81 MG/1
81 TABLET, COATED ORAL EVERY 12 HOURS
Refills: 0 | Status: DISCONTINUED | OUTPATIENT
Start: 2025-02-11 | End: 2025-02-14

## 2025-02-10 RX ORDER — SERTRALINE HCL 100 MG
50 TABLET ORAL DAILY
Refills: 0 | Status: DISCONTINUED | OUTPATIENT
Start: 2025-02-10 | End: 2025-02-14

## 2025-02-10 RX ORDER — OXYCODONE HYDROCHLORIDE 30 MG/1
5 TABLET ORAL
Refills: 0 | Status: DISCONTINUED | OUTPATIENT
Start: 2025-02-10 | End: 2025-02-14

## 2025-02-10 RX ORDER — HYDROMORPHONE HYDROCHLORIDE 4 MG/ML
0.2 INJECTION, SOLUTION INTRAMUSCULAR; INTRAVENOUS; SUBCUTANEOUS
Refills: 0 | Status: DISCONTINUED | OUTPATIENT
Start: 2025-02-10 | End: 2025-02-10

## 2025-02-10 RX ORDER — CELECOXIB 200 MG
200 CAPSULE ORAL EVERY 12 HOURS
Refills: 0 | Status: DISCONTINUED | OUTPATIENT
Start: 2025-02-10 | End: 2025-02-10

## 2025-02-10 RX ORDER — CEFAZOLIN SODIUM IN 0.9 % NACL 2 G/10 ML
2000 SYRINGE (ML) INTRAVENOUS EVERY 8 HOURS
Refills: 0 | Status: COMPLETED | OUTPATIENT
Start: 2025-02-10 | End: 2025-02-10

## 2025-02-10 RX ORDER — ATORVASTATIN CALCIUM 80 MG/1
20 TABLET, FILM COATED ORAL AT BEDTIME
Refills: 0 | Status: DISCONTINUED | OUTPATIENT
Start: 2025-02-10 | End: 2025-02-14

## 2025-02-10 RX ORDER — ACETAMINOPHEN 160 MG/5ML
1000 SUSPENSION ORAL ONCE
Refills: 0 | Status: COMPLETED | OUTPATIENT
Start: 2025-02-10 | End: 2025-02-10

## 2025-02-10 RX ADMIN — ANTISEPTIC SURGICAL SCRUB 1 APPLICATION(S): 0.04 SOLUTION TOPICAL at 07:16

## 2025-02-10 RX ADMIN — Medication 100 MILLIGRAM(S): at 23:54

## 2025-02-10 RX ADMIN — VALACYCLOVIR 500 MILLIGRAM(S): 1000 TABLET ORAL at 14:23

## 2025-02-10 RX ADMIN — Medication 100 MILLIGRAM(S): at 17:03

## 2025-02-10 RX ADMIN — ACETAMINOPHEN 400 MILLIGRAM(S): 160 SUSPENSION ORAL at 15:09

## 2025-02-10 RX ADMIN — SODIUM CHLORIDE 500 MILLILITER(S): 9 INJECTION, SOLUTION INTRAVENOUS at 16:49

## 2025-02-10 RX ADMIN — ACETAMINOPHEN 1000 MILLIGRAM(S): 160 SUSPENSION ORAL at 21:23

## 2025-02-10 RX ADMIN — Medication 100 MILLIGRAM(S): at 21:23

## 2025-02-10 RX ADMIN — Medication 50 MILLIGRAM(S): at 14:23

## 2025-02-10 RX ADMIN — PREGABALIN CAPSULES, CV 100 MILLIGRAM(S): 225 CAPSULE ORAL at 17:03

## 2025-02-10 RX ADMIN — APREPITANT 40 MILLIGRAM(S): 40 CAPSULE ORAL at 07:16

## 2025-02-10 RX ADMIN — Medication 100 MILLIGRAM(S): at 21:27

## 2025-02-10 RX ADMIN — Medication 4 MILLIGRAM(S): at 21:23

## 2025-02-10 RX ADMIN — ATORVASTATIN CALCIUM 20 MILLIGRAM(S): 80 TABLET, FILM COATED ORAL at 21:23

## 2025-02-10 RX ADMIN — Medication 500 MILLILITER(S): at 12:15

## 2025-02-10 RX ADMIN — ACETAMINOPHEN 1000 MILLIGRAM(S): 160 SUSPENSION ORAL at 21:27

## 2025-02-10 RX ADMIN — SODIUM CHLORIDE 100 MILLILITER(S): 9 INJECTION, SOLUTION INTRAVENOUS at 21:24

## 2025-02-10 RX ADMIN — SODIUM CHLORIDE 75 MILLILITER(S): 9 INJECTION, SOLUTION INTRAVENOUS at 12:15

## 2025-02-10 RX ADMIN — Medication 4 MILLIGRAM(S): at 14:23

## 2025-02-10 RX ADMIN — SODIUM CHLORIDE 100 MILLILITER(S): 9 INJECTION, SOLUTION INTRAVENOUS at 14:23

## 2025-02-10 RX ADMIN — ACETAMINOPHEN 1000 MILLIGRAM(S): 160 SUSPENSION ORAL at 15:39

## 2025-02-10 RX ADMIN — Medication 500 MILLILITER(S): at 14:23

## 2025-02-10 NOTE — DISCHARGE NOTE PROVIDER - NSDCCPTREATMENT_GEN_ALL_CORE_FT
2020 Prior Authorization Request  Who's requesting PA: Pharmacy  Provider: James  Pharmacy Name, Location & Phone # : [id number] - [place], [place] - [address] AT [facility] [address] [phone]  Medication Name: Duloxetine HCI 60mg dr capsules, 3/day  Quantity: 90  Refills: 2  Days Supply: 30  Medication Instructions: Take 1 capsule (60 mg total) by mouth 3 (three) times a day  Insurance Plan: MEDICAID [place]  Insurance Member ID & GRP # : [id number]   CoverMyMeds Key: [id number]  Informed patient that prior authorizations can take up to 10 business days for response: YES  Okay to leave a detailed message: YES    Route to: [facility] MED ([id number])     PRINCIPAL PROCEDURE  Procedure: Right total knee replacement  Findings and Treatment: ISAIAH

## 2025-02-10 NOTE — DISCHARGE NOTE PROVIDER - HOSPITAL COURSE
This patient is a 64 y.o. female with severe osteoarthritis of the right knee.  After admission on 2/10/25 and receiving pre-operative parenteral prophylactic antibiotics, the patient underwent an uncomplicated Right Total Knee Replacement by Dr. Vazquez.    A medical consultation from the Hospitalist service was obtained for post-operative medical co-management.   Typical Physical & occupational therapy modalities post Right Total Knee Replacement were performed including ambulation training, range of motion, ADL's, and transfers.  __ was given for DVT prophylaxis.  The patient had a clean appearing dressing with no sign of surgical site infections and had a stable neuro / vascular exam of the operated extremity.     Discharge and Orthopedic Care instructions were delineated in the Discharge Plan and reviewed with the patient.   All medications were delineated in the medication reconciliation tool and key points were reviewed with the patient.   The patient was deemed stable from an Orthopedic & medical standpoint for discharge today.  She will follow up with Dr. Vazquez for further orthopedic care upon completion of Home care PT.  Patient is going home with home care PT This patient is a 64 y.o. female with severe osteoarthritis of the right knee.  After admission on 2/10/25 and receiving pre-operative parenteral prophylactic antibiotics, the patient underwent an uncomplicated Right Total Knee Replacement by Dr. Vazquez.    A medical consultation from the Hospitalist service was obtained for post-operative medical co-management.   Typical Physical & occupational therapy modalities post Right Total Knee Replacement were performed including ambulation training, range of motion, ADL's, and transfers.  Aspirin 81mg q 12 h was given for DVT prophylaxis.  Patient was provided with a jose brace for stability of the right knee  The patient had a clean appearing dressing with no sign of surgical site infections and had a stable neuro / vascular exam of the operated extremity.     Discharge and Orthopedic Care instructions were delineated in the Discharge Plan and reviewed with the patient.   All medications were delineated in the medication reconciliation tool and key points were reviewed with the patient.   The patient was deemed stable from an Orthopedic & medical standpoint for discharge today.  She will follow up with Dr. Vazquez for further orthopedic care upon completion of Home care PT.  Patient is going home with home care PT This patient is a 64 y.o. female with severe osteoarthritis of the right knee.  After admission on 2/10/25 and receiving pre-operative parenteral prophylactic antibiotics, the patient underwent an uncomplicated Right Total Knee Replacement by Dr. Vazquez.    A medical consultation from the Hospitalist service was obtained for post-operative medical co-management.   Typical Physical & occupational therapy modalities post Right Total Knee Replacement were performed including ambulation training, range of motion, ADL's, and transfers.  Aspirin 81mg q 12 h was given for DVT prophylaxis.  Patient was provided with a jose brace for stability of the right knee  The patient had a clean appearing dressing with no sign of surgical site infections and had a stable neuro / vascular exam of the operated extremity.     Discharge and Orthopedic Care instructions were delineated in the Discharge Plan and reviewed with the patient.   All medications were delineated in the medication reconciliation tool and key points were reviewed with the patient.   The patient was deemed stable from an Orthopedic & medical standpoint for discharge today to a rehab facility.  She will follow up with Dr. Vazquez for further orthopedic care upon discharge from rehab.   This patient is a 64 y.o. female with severe osteoarthritis of the right knee.  After admission on 2/10/25 and receiving pre-operative parenteral prophylactic antibiotics, the patient underwent an uncomplicated Right Total Knee Replacement by Dr. Vazquez.    A medical consultation from the Hospitalist service was obtained for post-operative medical co-management.   Typical Physical & occupational therapy modalities post Right Total Knee Replacement were performed including ambulation training, range of motion, ADL's, and transfers.  Aspirin 81mg q 12 h was given for DVT prophylaxis.  Patient was provided with a jose brace for stability of the right knee  The patient had a clean appearing dressing with no sign of surgical site infections and had a stable neuro / vascular exam of the operated extremity.     Discharge and Orthopedic Care instructions were delineated in the Discharge Plan and reviewed with the patient.   All medications were delineated in the medication reconciliation tool and key points were reviewed with the patient.   The patient was deemed stable from an Orthopedic & medical standpoint for discharge today to home w/ home PT.  She will follow up with Dr. Vazquez for further orthopedic care upon completion of home PT.

## 2025-02-10 NOTE — PHYSICAL THERAPY INITIAL EVALUATION ADULT - GAIT TRAINING, PT EVAL
Patient will ambulate 150 feet independently with a rolling walker within 1-3 days for safe community ambulation. Patient will ascend/descend 4 stairs independently with straight canewithin 1-3 days for safe household stair negotiation.

## 2025-02-10 NOTE — DISCHARGE NOTE PROVIDER - NSDCMRMEDTOKEN_GEN_ALL_CORE_FT
Iron 100 Plus oral tablet: 1 tab(s) orally once a day  losartan-hydrochlorothiazide 50 mg-12.5 mg oral tablet: 1 tab(s) orally once a day  Lyrica 100 mg oral capsule: 1 cap(s) orally 2 times a day  metoprolol succinate 25 mg oral tablet, extended release: 1 tab(s) orally once a day  pantoprazole 40 mg oral delayed release tablet: 1 tab(s) orally once a day (before a meal)  sertraline 50 mg oral tablet: 1 tab(s) orally once a day  simvastatin 40 mg oral tablet: 1 tab(s) orally once a day  valACYclovir 500 mg oral tablet: 1 tab(s) orally once a day  Vitamin B12 1000 mcg oral tablet: 1 tab(s) orally once a day  Vyzulta 0.024% ophthalmic solution: 1 drop(s) to each affected eye once a day (in the evening)  Zanaflex 4 mg oral tablet: 1 tab(s) orally every 8 hours   acetaminophen 500 mg oral tablet: 2 tab(s) orally every 8 hours  [place] Brace: unit(s)  celecoxib 100 mg oral capsule: 1 cap(s) orally every 12 hours take 2 hrs after ecotrin  Iron 100 Plus oral tablet: 1 tab(s) orally once a day  losartan-hydrochlorothiazide 50 mg-12.5 mg oral tablet: 1 tab(s) orally once a day  Lyrica 100 mg oral capsule: 1 cap(s) orally 2 times a day  metoprolol succinate 25 mg oral tablet, extended release: 1 tab(s) orally once a day  oxyCODONE 10 mg oral tablet: 1 tab(s) orally every 3 hours As needed Severe Pain (7 - 10)  oxyCODONE 5 mg oral tablet: 1 tab(s) orally every 3 hours As needed Moderate Pain (4 - 6)  pantoprazole 40 mg oral delayed release tablet: 1 tab(s) orally once a day (before a meal)  polyethylene glycol 3350 oral powder for reconstitution: 17 gram(s) orally once a day (at bedtime)  senna leaf extract oral tablet: 2 tab(s) orally once a day (at bedtime)  sertraline 50 mg oral tablet: 1 tab(s) orally once a day  simvastatin 40 mg oral tablet: 1 tab(s) orally once a day  valACYclovir 500 mg oral tablet: 1 tab(s) orally once a day  Vitamin B12 1000 mcg oral tablet: 1 tab(s) orally once a day  Vyzulta 0.024% ophthalmic solution: 1 drop(s) to each affected eye once a day (in the evening)  Zanaflex 4 mg oral tablet: 1 tab(s) orally every 8 hours   acetaminophen 500 mg oral tablet: 2 tab(s) orally every 8 hours  aspirin 81 mg oral delayed release tablet: 1 tab(s) orally every 12 hours for 28 days. take 2 hrs before celebrex  [place] Brace: wear while walking.  may take off in bed  celecoxib 100 mg oral capsule: 1 cap(s) orally every 12 hours take 2 hrs after ecotrin  Iron 100 Plus oral tablet: 1 tab(s) orally once a day  losartan-hydrochlorothiazide 50 mg-12.5 mg oral tablet: 1 tab(s) orally once a day  Lyrica 100 mg oral capsule: 1 cap(s) orally 2 times a day  metoprolol succinate 25 mg oral tablet, extended release: 1 tab(s) orally once a day  oxyCODONE 10 mg oral tablet: 1 tab(s) orally every 3 hours As needed Severe Pain (7 - 10)  oxyCODONE 5 mg oral tablet: 1 tab(s) orally every 3 hours As needed Moderate Pain (4 - 6)  pantoprazole 40 mg oral delayed release tablet: 1 tab(s) orally once a day (before a meal)  polyethylene glycol 3350 oral powder for reconstitution: 17 gram(s) orally once a day (at bedtime)  senna leaf extract oral tablet: 2 tab(s) orally once a day (at bedtime)  sertraline 50 mg oral tablet: 1 tab(s) orally once a day  simvastatin 40 mg oral tablet: 1 tab(s) orally once a day  valACYclovir 500 mg oral tablet: 1 tab(s) orally once a day  Vitamin B12 1000 mcg oral tablet: 1 tab(s) orally once a day  Vyzulta 0.024% ophthalmic solution: 1 drop(s) to each affected eye once a day (in the evening)  Zanaflex 4 mg oral tablet: 1 tab(s) orally every 8 hours   acetaminophen 500 mg oral tablet: 2 tab(s) orally every 8 hours  aspirin 81 mg oral delayed release tablet: 1 tab(s) orally every 12 hours take 2 hrs before celebrex  [place] Brace: wear while walking.  may take off in bed  celecoxib 100 mg oral capsule: 1 cap(s) orally every 12 hours take 2 hrs after ecotrin  Iron 100 Plus oral tablet: 1 tab(s) orally once a day  losartan-hydrochlorothiazide 50 mg-12.5 mg oral tablet: 1 tab(s) orally once a day  Lyrica 100 mg oral capsule: 1 cap(s) orally 2 times a day  metoprolol succinate 25 mg oral tablet, extended release: 1 tab(s) orally once a day  oxyCODONE 5 mg oral tablet: 1 tab(s) orally every 4 hours as needed for  moderate pain or 2 tabs every 4-6 hrs s needed for severe pain    [id number] MDD: 6  pantoprazole 40 mg oral delayed release tablet: 1 tab(s) orally once a day (before a meal)  polyethylene glycol 3350 oral powder for reconstitution: 17 gram(s) orally once a day (at bedtime)  senna leaf extract oral tablet: 2 tab(s) orally once a day (at bedtime)  sertraline 50 mg oral tablet: 1 tab(s) orally once a day  simvastatin 40 mg oral tablet: 1 tab(s) orally once a day  valACYclovir 500 mg oral tablet: 1 tab(s) orally once a day  Vitamin B12 1000 mcg oral tablet: 1 tab(s) orally once a day  Vyzulta 0.024% ophthalmic solution: 1 drop(s) to each affected eye once a day (in the evening)  Zanaflex 4 mg oral tablet: 1 tab(s) orally every 8 hours

## 2025-02-10 NOTE — DISCHARGE NOTE PROVIDER - NSDCFUSCHEDAPPT_GEN_ALL_CORE_FT
Milan Vazquez  [facility]  ORTHOSURG [address]  Scheduled Appointment: 02/10/2025    [facility]  ORTHOSURG [address]  Scheduled Appointment: 02/28/2025    Milan Vazquez  [facility]  ORTHOSURG [address]  Scheduled Appointment: 04/18/2025     [facility]  ORTHOSURG [address]  Scheduled Appointment: 02/28/2025    [facility] [address]  Scheduled Appointment: 04/18/2025

## 2025-02-10 NOTE — DISCHARGE NOTE PROVIDER - NSDCCPCAREPLAN_GEN_ALL_CORE_FT
PRINCIPAL DISCHARGE DIAGNOSIS  Diagnosis: Primary osteoarthritis of right knee  Assessment and Plan of Treatment: Physical Therapy/Occupational Therapy for: ambulation, transfers, stairs, ADL's (activities of daily living), and range of motion exercises  -Activity  • Weight Bearing as tolerated with rolling walker.  • Take short, frequent walks increasing the distance that you walk each day as tolerated.  • Change your position every hour to decrease pain and stiffness.  • Continue the exercises taught to you by your physical therapist.  • No driving until cleared by the doctor.  • No tub baths, hot tubs, or swimming pools until instructed by your doctor.  • Do not squat down on the floor.  • Do not kneel or twist your knee.  • Range of Motion Goals: Flexion= 120 degrees, Extension = 0 degrees  Keep incision clean. DO NOT APPLY ANYTHING to incision site (salves/ointments/creams). Do not scrub incision site. Pat dry after shower.  Prineo removal 2 weeks after surgery at Surgeon's office.  Apply cryocuff to operative knee for 20 minutes at a time, several times per day, with at least 1 hour break in between sessions.  Follow up with your primary care physician within 2-3 weeks of discharge home     PRINCIPAL DISCHARGE DIAGNOSIS  Diagnosis: Primary osteoarthritis of right knee  Assessment and Plan of Treatment: Physical Therapy/Occupational Therapy for: ambulation, transfers, stairs, ADL's (activities of daily living), and range of motion exercises  -Activity  • Weight Bearing as tolerated with rolling walker.  • Take short, frequent walks increasing the distance that you walk each day as tolerated.  • Change your position every hour to decrease pain and stiffness.  • Continue the exercises taught to you by your physical therapist.  • No driving until cleared by the doctor.  • No tub baths, hot tubs, or swimming pools until instructed by your doctor.  • Do not squat down on the floor.  • Do not kneel or twist your knee.  • Range of Motion Goals: Flexion= 120 degrees, Extension = 0 degrees  Keep incision clean. DO NOT APPLY ANYTHING to incision site (salves/ointments/creams). Do not scrub incision site. Pat dry after shower.  Prineo removal 2 weeks after surgery at Surgeon's office.  Apply cryocuff to operative knee for 20 minutes at a time, several times per day, with at least 1 hour break in between sessions.  Follow up with your primary care physician within 2-3 weeks of discharge home  You may shower. Dressing is water resistant, not waterproof. Do not aim shower stream at surgical site.  Pat dry after showering.  Mepilex dressing will be removed 1 week post-op.   Continue Bledose brace while out of bed for stability of the right knee.     PRINCIPAL DISCHARGE DIAGNOSIS  Diagnosis: Primary osteoarthritis of right knee  Assessment and Plan of Treatment: Physical Therapy/Occupational Therapy for: ambulation, transfers, stairs, ADL's (activities of daily living), and range of motion exercises  -Activity  • Weight Bearing as tolerated with rolling walker.  • Take short, frequent walks increasing the distance that you walk each day as tolerated.  • Change your position every hour to decrease pain and stiffness.  • Continue the exercises taught to you by your physical therapist.  • No driving until cleared by the doctor.  • No tub baths, hot tubs, or swimming pools until instructed by your doctor.  • Do not squat down on the floor.  • Do not kneel or twist your knee.  • Range of Motion Goals: Flexion= 120 degrees, Extension = 0 degrees  Keep incision clean. DO NOT APPLY ANYTHING to incision site (salves/ointments/creams). Do not scrub incision site. Pat dry after shower.  Prineo removal 2 weeks after surgery at Surgeon's office.  Apply ice 20 minutes on alternating with 20 minutes off for 48 hours post op to operative knee for 20 minutes at a time, several times per day, with at least 1 hour break in between sessions.  Follow up with your primary care physician within 2-3 weeks of discharge home  You may shower. Dressing is water resistant, not waterproof. Do not aim shower stream at surgical site.  Pat dry after showering.  Mepilex dressing will be removed 1 week post-op.   Continue Bledose brace while out of bed for stability of the right knee.

## 2025-02-10 NOTE — CONSULT NOTE ADULT - ASSESSMENT
65 yo female, pmh of hypertension, herpes, gerd, spinal stenosis, hld, glaucoma presenting for right knee replacement.    right knee replacement  - pain control - standing tylenol 1000 q8h,  - celebrex 100 bid  oxycodone 5/10 prn for mod/severe pain, dilaudid iv for breakthrough pain  - zanaflex  - bowel regimen   -aspirin bid for dvt ppx   - ivf    htn - hold losartan for now, loose control acceptable to prevent hypotension  continue toprol with hold parameters    herpes - continue valtrex 500 daily    hld - continue statin - lipitor 10, zocor non formulary    spinal stenosis - continue sertraline, lyrica    gerd- protonix   65 yo female, pmh of hypertension, herpes, gerd, spinal stenosis, hld, glaucoma presenting for right knee replacement.    right knee replacement  - pain control - standing tylenol 1000 q8h,  - celebrex 100 bid  oxycodone 5/10 prn for mod/severe pain, dilaudid iv for breakthrough pain  - zanaflex  - bowel regimen   -aspirin bid for dvt ppx   - ivf    htn - hold losartan, HCTZ for now, loose control acceptable to prevent hypotension  continue toprol with hold parameters    herpes - continue valtrex 500 daily    hld - continue statin - lipitor 10, zocor non formulary    spinal stenosis - continue sertraline, lyrica    gerd- protonix

## 2025-02-10 NOTE — PHYSICAL THERAPY INITIAL EVALUATION ADULT - PERTINENT HX OF CURRENT PROBLEM, REHAB EVAL
Pt is a 65 y/o female with right knee primary OA. Pt is s/p right total knee replacement on 2/10/25.

## 2025-02-10 NOTE — PHYSICAL THERAPY INITIAL EVALUATION ADULT - ADDITIONAL COMMENTS
Pt lives with family in a private house, there are 4 stairs to enter and no stairs to negotiate within. Pt has a walk in shower. PTA pt was independent with ADLs and ambulation with RW. Pt owns RW and cane.

## 2025-02-10 NOTE — DISCHARGE NOTE PROVIDER - CARE PROVIDER_API CALL
Milan Vazquez  Orthopaedic Surgery  [address]  [place] [address]  Phone: [phone]  Fax: [phone]  Follow Up Time: 2 weeks

## 2025-02-10 NOTE — PHYSICAL THERAPY INITIAL EVALUATION ADULT - GAIT DEVIATIONS NOTED, PT EVAL
decreased teresa/increased time in double stance/decreased step length/decreased weight-shifting ability

## 2025-02-10 NOTE — CONSULT NOTE ADULT - SUBJECTIVE AND OBJECTIVE BOX
HPI:  65 yo female, pmh of hypertension, herpes, gerd, spinal stenosis, hld, glaucoma presenting for right knee replacement. Seen on surgical floor, pain fair controlled, no dizziness, nausea, headaches, fever, chills.     PAST MEDICAL & SURGICAL HISTORY:  Hypertension      Genital herpes      Arthritis      Bronchitis      OA (osteoarthritis)      GERD (gastroesophageal reflux disease)      Dyslipidemia (high LDL; low HDL)      Spinal stenosis      Osteoarthritis of right knee      Glaucoma      Cataract      Irregular heart rate      S/P   x 2      S/P cervical spinal fusion        S/P shoulder surgery  Left (  )  & Joint resection in       S/P bariatric surgery  2003      History of carpal tunnel release  on left wrist 2012      S/P lumbar spinal fusion          ANTIMICROBIAL:  ceFAZolin   IVPB 2000 milliGRAM(s) IV Intermittent every 8 hours  valACYclovir 500 milliGRAM(s) Oral daily    CARDIOVASCULAR:  metoprolol succinate ER 25 milliGRAM(s) Oral daily    PULMONARY:    NEUROLOGIC:  acetaminophen     Tablet .. 1000 milliGRAM(s) Oral every 8 hours  acetaminophen   IVPB .. 1000 milliGRAM(s) IV Intermittent once  celecoxib 100 milliGRAM(s) Oral every 12 hours  HYDROmorphone  Injectable 0.5 milliGRAM(s) IV Push every 3 hours PRN  ondansetron Injectable 4 milliGRAM(s) IV Push every 6 hours PRN  oxyCODONE    IR 5 milliGRAM(s) Oral every 3 hours PRN  oxyCODONE    IR 10 milliGRAM(s) Oral every 3 hours PRN  pregabalin 100 milliGRAM(s) Oral two times a day  sertraline 50 milliGRAM(s) Oral daily  tiZANidine 4 milliGRAM(s) Oral every 8 hours    ONCOLOGIC:    HEMATOLOGIC:    GATROINTESTINAL:  aluminum hydroxide/magnesium hydroxide/simethicone Suspension 30 milliLiter(s) Oral four times a day PRN  magnesium hydroxide Suspension 30 milliLiter(s) Oral daily PRN  pantoprazole    Tablet 40 milliGRAM(s) Oral before breakfast  polyethylene glycol 3350 17 Gram(s) Oral at bedtime  senna 2 Tablet(s) Oral at bedtime     MEDS:    ENDO/METABOLIC:  atorvastatin 20 milliGRAM(s) Oral at bedtime    IV FLUID/NUTRITION:  lactated ringers. 1000 milliLiter(s) IV Continuous <Continuous>    TOPICAL:    IMMUNOLOGIC & OTHER        Allergies    No Known Allergies    Intolerances        SOCIAL HISTORY:  denies etoh, smoking    FAMILY HISTORY:  Family history of ovarian cancer  Mother  of ovarian cancer at age 62    Family history of diabetes mellitus        Vital Signs Last 24 Hrs  T(C): 36.4 (10 Feb 2025 12:43), Max: 36.4 (10 Feb 2025 11:02)  T(F): 97.5 (10 Feb 2025 12:43), Max: 97.5 (10 Feb 2025 11:02)  HR: 80 (10 Feb 2025 12:43) (60 - 83)  BP: 117/68 (10 Feb 2025 12:43) (115/65 - 169/86)  BP(mean): --  RR: 19 (10 Feb 2025 12:43) (13 - 19)  SpO2: 99% (10 Feb 2025 12:43) (97% - 100%)    Parameters below as of 10 Feb 2025 12:43  Patient On (Oxygen Delivery Method): room air          I&O's Detail    10 Feb 2025 07:01  -  10 Feb 2025 15:08  --------------------------------------------------------  IN:    IV PiggyBack: 500 mL    Lactated Ringers: 1000 mL    Lactated Ringers: 100 mL    Oral Fluid: 240 mL    Sodium Chloride 0.9% Bolus: 500 mL  Total IN: 2340 mL    OUT:    Blood Loss (mL): 150 mL  Total OUT: 150 mL    Total NET: 2190 mL        Daily Height in cm: 160.02 (10 Feb 2025 06:52)    Daily     REVIEW OF SYSTEMS:  as per hpi, other systems reviewed and are negative    PHYSICAL EXAM:    GENERAL: NAD, well-groomed, adult female  HEAD:  Atraumatic, Normocephalic  EYES: EOMI, PERRLA, conjunctiva and sclera clear  ENMT: No tonsillar erythema, exudates, or enlargement; Moist mucous membranes  NECK: Supple, No JVD  NERVOUS SYSTEM:  Alert & Oriented X3, Good concentration; CN intact  CHEST/LUNG: Clear to auscultation bilaterally; No rales, rhonchi, wheezing, or rubs  HEART: Regular rate and rhythm; No murmurs, rubs, or gallops  ABDOMEN: Soft, Nontender, Nondistended; Bowel sounds present  EXTREMITIES:  2+ Peripheral Pulses, right knee wrapped in ace bandage          LABS:    Reviewed presurgical H+P, presurgical labs                   RADIOLOGY & ADDITIONAL STUDIES:

## 2025-02-11 LAB
ANION GAP SERPL CALC-SCNC: 6 MMOL/L — SIGNIFICANT CHANGE UP (ref 5–17)
BUN SERPL-MCNC: 21 MG/DL — SIGNIFICANT CHANGE UP (ref 7–23)
CALCIUM SERPL-MCNC: 9 MG/DL — SIGNIFICANT CHANGE UP (ref 8.4–10.5)
CHLORIDE SERPL-SCNC: 107 MMOL/L — SIGNIFICANT CHANGE UP (ref 96–108)
CO2 SERPL-SCNC: 28 MMOL/L — SIGNIFICANT CHANGE UP (ref 22–31)
CREAT SERPL-MCNC: 1.05 MG/DL — SIGNIFICANT CHANGE UP (ref 0.5–1.3)
EGFR: 60 ML/MIN/1.73M2 — SIGNIFICANT CHANGE UP
GLUCOSE SERPL-MCNC: 155 MG/DL — HIGH (ref 70–99)
HCT VFR BLD CALC: 28.2 % — LOW (ref 34.5–45)
HGB BLD-MCNC: 9.5 G/DL — LOW (ref 11.5–15.5)
MCHC RBC-ENTMCNC: 29.9 PG — SIGNIFICANT CHANGE UP (ref 27–34)
MCHC RBC-ENTMCNC: 33.7 G/DL — SIGNIFICANT CHANGE UP (ref 32–36)
MCV RBC AUTO: 88.7 FL — SIGNIFICANT CHANGE UP (ref 80–100)
NRBC # BLD: 0 /100 WBCS — SIGNIFICANT CHANGE UP (ref 0–0)
NRBC BLD-RTO: 0 /100 WBCS — SIGNIFICANT CHANGE UP (ref 0–0)
PLAT MORPH BLD: NORMAL — SIGNIFICANT CHANGE UP
PLATELET # BLD AUTO: 120 K/UL — LOW (ref 150–400)
PLATELET COUNT - ESTIMATE: ABNORMAL
POTASSIUM SERPL-MCNC: 3.9 MMOL/L — SIGNIFICANT CHANGE UP (ref 3.5–5.3)
POTASSIUM SERPL-SCNC: 3.9 MMOL/L — SIGNIFICANT CHANGE UP (ref 3.5–5.3)
RBC # BLD: 3.18 M/UL — LOW (ref 3.8–5.2)
RBC # FLD: 12.5 % — SIGNIFICANT CHANGE UP (ref 10.3–14.5)
RBC BLD AUTO: NORMAL — SIGNIFICANT CHANGE UP
SODIUM SERPL-SCNC: 141 MMOL/L — SIGNIFICANT CHANGE UP (ref 135–145)
WBC # BLD: 8.66 K/UL — SIGNIFICANT CHANGE UP (ref 3.8–10.5)
WBC # FLD AUTO: 8.66 K/UL — SIGNIFICANT CHANGE UP (ref 3.8–10.5)

## 2025-02-11 RX ORDER — CELECOXIB 200 MG
1 CAPSULE ORAL
Qty: 0 | Refills: 0 | DISCHARGE
Start: 2025-02-11

## 2025-02-11 RX ORDER — BACTERIOSTATIC SODIUM CHLORIDE 0.9 %
1000 VIAL (ML) INJECTION ONCE
Refills: 0 | Status: COMPLETED | OUTPATIENT
Start: 2025-02-11 | End: 2025-02-11

## 2025-02-11 RX ORDER — SENNOSIDES 8.6 MG
2 TABLET ORAL
Qty: 0 | Refills: 0 | DISCHARGE
Start: 2025-02-11

## 2025-02-11 RX ORDER — POLYETHYLENE GLYCOL 3350 17 G/17G
17 POWDER, FOR SOLUTION ORAL
Qty: 0 | Refills: 0 | DISCHARGE
Start: 2025-02-11

## 2025-02-11 RX ORDER — OXYCODONE HYDROCHLORIDE 30 MG/1
1 TABLET ORAL
Qty: 0 | Refills: 0 | DISCHARGE
Start: 2025-02-11

## 2025-02-11 RX ORDER — ACETAMINOPHEN 160 MG/5ML
2 SUSPENSION ORAL
Qty: 0 | Refills: 0 | DISCHARGE
Start: 2025-02-11

## 2025-02-11 RX ADMIN — Medication 100 MILLIGRAM(S): at 10:10

## 2025-02-11 RX ADMIN — ACETAMINOPHEN 1000 MILLIGRAM(S): 160 SUSPENSION ORAL at 22:00

## 2025-02-11 RX ADMIN — ACETAMINOPHEN 1000 MILLIGRAM(S): 160 SUSPENSION ORAL at 21:40

## 2025-02-11 RX ADMIN — OXYCODONE HYDROCHLORIDE 10 MILLIGRAM(S): 30 TABLET ORAL at 14:17

## 2025-02-11 RX ADMIN — OXYCODONE HYDROCHLORIDE 10 MILLIGRAM(S): 30 TABLET ORAL at 12:51

## 2025-02-11 RX ADMIN — PREGABALIN CAPSULES, CV 100 MILLIGRAM(S): 225 CAPSULE ORAL at 18:22

## 2025-02-11 RX ADMIN — ACETAMINOPHEN 1000 MILLIGRAM(S): 160 SUSPENSION ORAL at 05:32

## 2025-02-11 RX ADMIN — Medication 50 MILLIGRAM(S): at 12:41

## 2025-02-11 RX ADMIN — DEXAMETHASONE SODIUM PHOSPHATE 101.6 MILLIGRAM(S): 4 INJECTION, SOLUTION INTRA-ARTICULAR; INTRALESIONAL; INTRAMUSCULAR; INTRAVENOUS; SOFT TISSUE at 05:21

## 2025-02-11 RX ADMIN — Medication 4 MILLIGRAM(S): at 14:45

## 2025-02-11 RX ADMIN — PANTOPRAZOLE 40 MILLIGRAM(S): 20 TABLET, DELAYED RELEASE ORAL at 05:27

## 2025-02-11 RX ADMIN — ASPIRIN 81 MILLIGRAM(S): 81 TABLET, COATED ORAL at 18:22

## 2025-02-11 RX ADMIN — ACETAMINOPHEN 1000 MILLIGRAM(S): 160 SUSPENSION ORAL at 05:20

## 2025-02-11 RX ADMIN — OXYCODONE HYDROCHLORIDE 5 MILLIGRAM(S): 30 TABLET ORAL at 11:27

## 2025-02-11 RX ADMIN — Medication 100 MILLIGRAM(S): at 21:40

## 2025-02-11 RX ADMIN — Medication 2 TABLET(S): at 21:41

## 2025-02-11 RX ADMIN — Medication 1000 MILLILITER(S): at 16:41

## 2025-02-11 RX ADMIN — ATORVASTATIN CALCIUM 20 MILLIGRAM(S): 80 TABLET, FILM COATED ORAL at 21:40

## 2025-02-11 RX ADMIN — VALACYCLOVIR 500 MILLIGRAM(S): 1000 TABLET ORAL at 12:41

## 2025-02-11 RX ADMIN — PREGABALIN CAPSULES, CV 100 MILLIGRAM(S): 225 CAPSULE ORAL at 05:20

## 2025-02-11 RX ADMIN — ASPIRIN 81 MILLIGRAM(S): 81 TABLET, COATED ORAL at 05:20

## 2025-02-11 RX ADMIN — OXYCODONE HYDROCHLORIDE 5 MILLIGRAM(S): 30 TABLET ORAL at 10:29

## 2025-02-11 RX ADMIN — ACETAMINOPHEN 1000 MILLIGRAM(S): 160 SUSPENSION ORAL at 15:45

## 2025-02-11 RX ADMIN — ACETAMINOPHEN 1000 MILLIGRAM(S): 160 SUSPENSION ORAL at 14:45

## 2025-02-11 RX ADMIN — Medication 100 MILLIGRAM(S): at 22:00

## 2025-02-11 RX ADMIN — Medication 100 MILLIGRAM(S): at 09:07

## 2025-02-11 RX ADMIN — Medication 4 MILLIGRAM(S): at 05:20

## 2025-02-11 RX ADMIN — Medication 4 MILLIGRAM(S): at 21:40

## 2025-02-11 NOTE — OCCUPATIONAL THERAPY INITIAL EVALUATION ADULT - ADDITIONAL COMMENTS
Pt lives with sister and brother in law in private home with 4 MELISSA no steps inside. Pt has walk in shower. PTA pt was ADL I however daughter assisted pt with showers secondary to unleveled shower stall. Pt has RW and cane

## 2025-02-11 NOTE — PHARMACOTHERAPY INTERVENTION NOTE - COMMENTS
Transition of Care video discharge education - medication calendar given to patient  Balbir Sims, PharmD Candidate '25 for [employer] [place], [place]. Clinical Pharmacy Specialist, Department of Orthopedics Surgery at [facility]. 
Admission medication reconciliation POD1

## 2025-02-11 NOTE — CHART NOTE - NSCHARTNOTEFT_GEN_A_CORE
Pt seen at bedside to measure and fit with jose rom knee brace to control medial-lateral instability.  brace fitted and adjusted to her dimensions.  DVT cuff replaced until tomorrow when she will be able to being ambulation training with PT.  Pt instructed to don and doff.  Written instructuons and office contact info provided    Follow up if needed      Isac ESPINAL  [phone]
Called by RN for BP of 78/50s manually. Patient reportedly asymptomatic without signs of bleeding.    - Bolus 1 L NS now  - Repeat blood pressure later  - Hold home Losartan & metoprolol  - Check CBC w/ T&S with tomorrow unless there is concern for bleed, then check it sooner. Though don't anticipate significant blood loss of knee surgery

## 2025-02-12 LAB
ANION GAP SERPL CALC-SCNC: 8 MMOL/L — SIGNIFICANT CHANGE UP (ref 5–17)
BLD GP AB SCN SERPL QL: SIGNIFICANT CHANGE UP
BUN SERPL-MCNC: 19 MG/DL — SIGNIFICANT CHANGE UP (ref 7–23)
CALCIUM SERPL-MCNC: 8.8 MG/DL — SIGNIFICANT CHANGE UP (ref 8.4–10.5)
CHLORIDE SERPL-SCNC: 112 MMOL/L — HIGH (ref 96–108)
CO2 SERPL-SCNC: 25 MMOL/L — SIGNIFICANT CHANGE UP (ref 22–31)
CREAT SERPL-MCNC: 0.96 MG/DL — SIGNIFICANT CHANGE UP (ref 0.5–1.3)
EGFR: 66 ML/MIN/1.73M2 — SIGNIFICANT CHANGE UP
GLUCOSE SERPL-MCNC: 127 MG/DL — HIGH (ref 70–99)
HCT VFR BLD CALC: 25 % — LOW (ref 34.5–45)
HGB BLD-MCNC: 8.4 G/DL — LOW (ref 11.5–15.5)
MCHC RBC-ENTMCNC: 29.8 PG — SIGNIFICANT CHANGE UP (ref 27–34)
MCHC RBC-ENTMCNC: 33.6 G/DL — SIGNIFICANT CHANGE UP (ref 32–36)
MCV RBC AUTO: 88.7 FL — SIGNIFICANT CHANGE UP (ref 80–100)
NRBC BLD AUTO-RTO: 0 /100 WBCS — SIGNIFICANT CHANGE UP (ref 0–0)
PLATELET # BLD AUTO: 104 K/UL — LOW (ref 150–400)
POTASSIUM SERPL-MCNC: 3.9 MMOL/L — SIGNIFICANT CHANGE UP (ref 3.5–5.3)
POTASSIUM SERPL-SCNC: 3.9 MMOL/L — SIGNIFICANT CHANGE UP (ref 3.5–5.3)
RBC # BLD: 2.82 M/UL — LOW (ref 3.8–5.2)
RBC # FLD: 12.8 % — SIGNIFICANT CHANGE UP (ref 10.3–14.5)
SODIUM SERPL-SCNC: 145 MMOL/L — SIGNIFICANT CHANGE UP (ref 135–145)
WBC # BLD: 6.83 K/UL — SIGNIFICANT CHANGE UP (ref 3.8–10.5)
WBC # FLD AUTO: 6.83 K/UL — SIGNIFICANT CHANGE UP (ref 3.8–10.5)

## 2025-02-12 PROCEDURE — 99232 SBSQ HOSP IP/OBS MODERATE 35: CPT

## 2025-02-12 RX ORDER — ASPIRIN 81 MG/1
1 TABLET, COATED ORAL
Qty: 0 | Refills: 0 | DISCHARGE
Start: 2025-02-12

## 2025-02-12 RX ADMIN — OXYCODONE HYDROCHLORIDE 5 MILLIGRAM(S): 30 TABLET ORAL at 22:20

## 2025-02-12 RX ADMIN — ASPIRIN 81 MILLIGRAM(S): 81 TABLET, COATED ORAL at 17:31

## 2025-02-12 RX ADMIN — Medication 100 MILLIGRAM(S): at 09:13

## 2025-02-12 RX ADMIN — OXYCODONE HYDROCHLORIDE 5 MILLIGRAM(S): 30 TABLET ORAL at 18:37

## 2025-02-12 RX ADMIN — Medication 4 MILLIGRAM(S): at 21:23

## 2025-02-12 RX ADMIN — Medication 4 MILLIGRAM(S): at 13:31

## 2025-02-12 RX ADMIN — OXYCODONE HYDROCHLORIDE 5 MILLIGRAM(S): 30 TABLET ORAL at 09:16

## 2025-02-12 RX ADMIN — ACETAMINOPHEN 1000 MILLIGRAM(S): 160 SUSPENSION ORAL at 21:24

## 2025-02-12 RX ADMIN — Medication 50 MILLIGRAM(S): at 13:32

## 2025-02-12 RX ADMIN — PREGABALIN CAPSULES, CV 100 MILLIGRAM(S): 225 CAPSULE ORAL at 17:31

## 2025-02-12 RX ADMIN — ATORVASTATIN CALCIUM 20 MILLIGRAM(S): 80 TABLET, FILM COATED ORAL at 21:22

## 2025-02-12 RX ADMIN — PREGABALIN CAPSULES, CV 100 MILLIGRAM(S): 225 CAPSULE ORAL at 05:56

## 2025-02-12 RX ADMIN — PANTOPRAZOLE 40 MILLIGRAM(S): 20 TABLET, DELAYED RELEASE ORAL at 05:57

## 2025-02-12 RX ADMIN — ACETAMINOPHEN 1000 MILLIGRAM(S): 160 SUSPENSION ORAL at 06:06

## 2025-02-12 RX ADMIN — ACETAMINOPHEN 1000 MILLIGRAM(S): 160 SUSPENSION ORAL at 21:23

## 2025-02-12 RX ADMIN — ACETAMINOPHEN 1000 MILLIGRAM(S): 160 SUSPENSION ORAL at 14:13

## 2025-02-12 RX ADMIN — Medication 100 MILLIGRAM(S): at 21:23

## 2025-02-12 RX ADMIN — VALACYCLOVIR 500 MILLIGRAM(S): 1000 TABLET ORAL at 13:31

## 2025-02-12 RX ADMIN — OXYCODONE HYDROCHLORIDE 5 MILLIGRAM(S): 30 TABLET ORAL at 21:31

## 2025-02-12 RX ADMIN — ASPIRIN 81 MILLIGRAM(S): 81 TABLET, COATED ORAL at 05:54

## 2025-02-12 RX ADMIN — Medication 4 MILLIGRAM(S): at 05:54

## 2025-02-12 RX ADMIN — OXYCODONE HYDROCHLORIDE 5 MILLIGRAM(S): 30 TABLET ORAL at 17:37

## 2025-02-12 RX ADMIN — OXYCODONE HYDROCHLORIDE 5 MILLIGRAM(S): 30 TABLET ORAL at 10:16

## 2025-02-12 RX ADMIN — ACETAMINOPHEN 1000 MILLIGRAM(S): 160 SUSPENSION ORAL at 13:31

## 2025-02-12 RX ADMIN — Medication 2 TABLET(S): at 21:23

## 2025-02-12 RX ADMIN — Medication 100 MILLIGRAM(S): at 21:24

## 2025-02-12 RX ADMIN — ACETAMINOPHEN 1000 MILLIGRAM(S): 160 SUSPENSION ORAL at 05:55

## 2025-02-12 RX ADMIN — Medication 100 MILLIGRAM(S): at 10:19

## 2025-02-12 NOTE — CASE MANAGEMENT PROGRESS NOTE - NSCMPROGRESSNOTE_GEN_ALL_CORE
CM met with patient at bedside.  Patient. A&O x 4. Pt s/p  PROCEDURES: s/p rt knee replacement   Pt  resting comfortably / Pt has no complaints at this time.  CM explained role of CM and availability to assist with discharge planning throughout stay.   Provided CM contact information and pt. verbalized understanding.  Additional Comments	Pt lives with family in a private house, there are 4 stairs to enter and no stairs to negotiate within. Pt has a walk in shower. PTA pt was independent with ADLs and ambulation with RW. Pt owns RW and cane.  Patient states she does not have a caregiver at home and does not feel safe to transition home.  Transition of care will be determined with progress. Home with [facility] VS [facility]. SW following for [facility] referral.  CM explained home care expectations, process, insurance provisions and home safety with good understanding.   Offered list of [facility] and pt. chose [facility] at home care agency.  Provided discharge resources folder. CM  will make referral accordingly when appropriate for discharge.   Pt verbalizing understanding and in agreement with d/c plans.

## 2025-02-13 ENCOUNTER — TRANSCRIPTION ENCOUNTER (OUTPATIENT)
Age: 65
End: 2025-02-13

## 2025-02-13 LAB
ANION GAP SERPL CALC-SCNC: 6 MMOL/L — SIGNIFICANT CHANGE UP (ref 5–17)
BUN SERPL-MCNC: 22 MG/DL — SIGNIFICANT CHANGE UP (ref 7–23)
CALCIUM SERPL-MCNC: 8.5 MG/DL — SIGNIFICANT CHANGE UP (ref 8.4–10.5)
CHLORIDE SERPL-SCNC: 108 MMOL/L — SIGNIFICANT CHANGE UP (ref 96–108)
CO2 SERPL-SCNC: 29 MMOL/L — SIGNIFICANT CHANGE UP (ref 22–31)
CREAT SERPL-MCNC: 0.95 MG/DL — SIGNIFICANT CHANGE UP (ref 0.5–1.3)
EGFR: 67 ML/MIN/1.73M2 — SIGNIFICANT CHANGE UP
GLUCOSE SERPL-MCNC: 113 MG/DL — HIGH (ref 70–99)
HCT VFR BLD CALC: 24.8 % — LOW (ref 34.5–45)
HGB BLD-MCNC: 8.1 G/DL — LOW (ref 11.5–15.5)
MCHC RBC-ENTMCNC: 29.5 PG — SIGNIFICANT CHANGE UP (ref 27–34)
MCHC RBC-ENTMCNC: 32.7 G/DL — SIGNIFICANT CHANGE UP (ref 32–36)
MCV RBC AUTO: 90.2 FL — SIGNIFICANT CHANGE UP (ref 80–100)
NRBC BLD AUTO-RTO: 0 /100 WBCS — SIGNIFICANT CHANGE UP (ref 0–0)
PLATELET # BLD AUTO: 113 K/UL — LOW (ref 150–400)
POTASSIUM SERPL-MCNC: 3.6 MMOL/L — SIGNIFICANT CHANGE UP (ref 3.5–5.3)
POTASSIUM SERPL-SCNC: 3.6 MMOL/L — SIGNIFICANT CHANGE UP (ref 3.5–5.3)
RBC # BLD: 2.75 M/UL — LOW (ref 3.8–5.2)
RBC # FLD: 13.2 % — SIGNIFICANT CHANGE UP (ref 10.3–14.5)
SODIUM SERPL-SCNC: 143 MMOL/L — SIGNIFICANT CHANGE UP (ref 135–145)
WBC # BLD: 5.78 K/UL — SIGNIFICANT CHANGE UP (ref 3.8–10.5)
WBC # FLD AUTO: 5.78 K/UL — SIGNIFICANT CHANGE UP (ref 3.8–10.5)

## 2025-02-13 PROCEDURE — 99232 SBSQ HOSP IP/OBS MODERATE 35: CPT

## 2025-02-13 RX ADMIN — Medication 100 MILLIGRAM(S): at 21:19

## 2025-02-13 RX ADMIN — ACETAMINOPHEN 1000 MILLIGRAM(S): 160 SUSPENSION ORAL at 05:24

## 2025-02-13 RX ADMIN — ATORVASTATIN CALCIUM 20 MILLIGRAM(S): 80 TABLET, FILM COATED ORAL at 21:16

## 2025-02-13 RX ADMIN — ASPIRIN 81 MILLIGRAM(S): 81 TABLET, COATED ORAL at 05:25

## 2025-02-13 RX ADMIN — Medication 100 MILLIGRAM(S): at 08:31

## 2025-02-13 RX ADMIN — Medication 4 MILLIGRAM(S): at 14:05

## 2025-02-13 RX ADMIN — Medication 4 MILLIGRAM(S): at 21:16

## 2025-02-13 RX ADMIN — OXYCODONE HYDROCHLORIDE 5 MILLIGRAM(S): 30 TABLET ORAL at 08:30

## 2025-02-13 RX ADMIN — Medication 50 MILLIGRAM(S): at 12:39

## 2025-02-13 RX ADMIN — Medication 4 MILLIGRAM(S): at 06:01

## 2025-02-13 RX ADMIN — PANTOPRAZOLE 40 MILLIGRAM(S): 20 TABLET, DELAYED RELEASE ORAL at 05:32

## 2025-02-13 RX ADMIN — Medication 100 MILLIGRAM(S): at 21:16

## 2025-02-13 RX ADMIN — OXYCODONE HYDROCHLORIDE 5 MILLIGRAM(S): 30 TABLET ORAL at 14:05

## 2025-02-13 RX ADMIN — OXYCODONE HYDROCHLORIDE 5 MILLIGRAM(S): 30 TABLET ORAL at 09:30

## 2025-02-13 RX ADMIN — ACETAMINOPHEN 1000 MILLIGRAM(S): 160 SUSPENSION ORAL at 14:35

## 2025-02-13 RX ADMIN — ACETAMINOPHEN 1000 MILLIGRAM(S): 160 SUSPENSION ORAL at 21:19

## 2025-02-13 RX ADMIN — ASPIRIN 81 MILLIGRAM(S): 81 TABLET, COATED ORAL at 17:41

## 2025-02-13 RX ADMIN — VALACYCLOVIR 500 MILLIGRAM(S): 1000 TABLET ORAL at 12:39

## 2025-02-13 RX ADMIN — PREGABALIN CAPSULES, CV 100 MILLIGRAM(S): 225 CAPSULE ORAL at 17:41

## 2025-02-13 RX ADMIN — PREGABALIN CAPSULES, CV 100 MILLIGRAM(S): 225 CAPSULE ORAL at 05:31

## 2025-02-13 RX ADMIN — ACETAMINOPHEN 1000 MILLIGRAM(S): 160 SUSPENSION ORAL at 21:15

## 2025-02-13 RX ADMIN — Medication 100 MILLIGRAM(S): at 09:06

## 2025-02-13 RX ADMIN — ACETAMINOPHEN 1000 MILLIGRAM(S): 160 SUSPENSION ORAL at 14:05

## 2025-02-13 RX ADMIN — OXYCODONE HYDROCHLORIDE 5 MILLIGRAM(S): 30 TABLET ORAL at 15:05

## 2025-02-13 RX ADMIN — ACETAMINOPHEN 1000 MILLIGRAM(S): 160 SUSPENSION ORAL at 05:28

## 2025-02-13 NOTE — DISCHARGE NOTE NURSING/CASE MANAGEMENT/SOCIAL WORK - FINANCIAL ASSISTANCE
[facility] provides services at a reduced cost to those who are determined to be eligible through [facility]’s financial assistance program. Information regarding [facility]’s financial assistance program can be found by going to [email or web address] or by calling [phone].

## 2025-02-13 NOTE — DISCHARGE NOTE NURSING/CASE MANAGEMENT/SOCIAL WORK - PATIENT PORTAL LINK FT
You can access the FollowMyHealth Patient Portal offered by [facility] by registering at the following website: [email or web address]. By joining [employer]’s FollowMyHealth portal, you will also be able to view your health information using other applications (apps) compatible with our system.

## 2025-02-13 NOTE — SOCIAL WORK PROGRESS NOTE - NSSWPROGRESSNOTE_GEN_ALL_CORE
SW received fiorella from home and community care.  They are asking for the MD to incude in his note that "patient is medically stable and ready for discharge".  Dr Camargo will do same.  Insurance also asking for updated PT notes and states the case will likely go peer to peer based on patients level of functioning.   SW to send requested documents and follow.

## 2025-02-13 NOTE — DISCHARGE NOTE NURSING/CASE MANAGEMENT/SOCIAL WORK - NSSCNAMETXT_GEN_ALL_CORE
[facility] care agency [phone] will reach out to you within 24-72 hours of your discharge to schedule home care visit/eval appointment with you. Please call agency for any queries regarding home care services

## 2025-02-14 VITALS
TEMPERATURE: 98 F | OXYGEN SATURATION: 95 % | DIASTOLIC BLOOD PRESSURE: 59 MMHG | HEART RATE: 73 BPM | RESPIRATION RATE: 18 BRPM | SYSTOLIC BLOOD PRESSURE: 95 MMHG

## 2025-02-14 LAB
ANION GAP SERPL CALC-SCNC: 2 MMOL/L — LOW (ref 5–17)
BUN SERPL-MCNC: 19 MG/DL — SIGNIFICANT CHANGE UP (ref 7–23)
CALCIUM SERPL-MCNC: 8.8 MG/DL — SIGNIFICANT CHANGE UP (ref 8.4–10.5)
CHLORIDE SERPL-SCNC: 109 MMOL/L — HIGH (ref 96–108)
CO2 SERPL-SCNC: 32 MMOL/L — HIGH (ref 22–31)
CREAT SERPL-MCNC: 0.99 MG/DL — SIGNIFICANT CHANGE UP (ref 0.5–1.3)
EGFR: 64 ML/MIN/1.73M2 — SIGNIFICANT CHANGE UP
GLUCOSE SERPL-MCNC: 143 MG/DL — HIGH (ref 70–99)
HCT VFR BLD CALC: 24.7 % — LOW (ref 34.5–45)
HGB BLD-MCNC: 8 G/DL — LOW (ref 11.5–15.5)
MCHC RBC-ENTMCNC: 29.3 PG — SIGNIFICANT CHANGE UP (ref 27–34)
MCHC RBC-ENTMCNC: 32.4 G/DL — SIGNIFICANT CHANGE UP (ref 32–36)
MCV RBC AUTO: 90.5 FL — SIGNIFICANT CHANGE UP (ref 80–100)
NRBC BLD AUTO-RTO: 0 /100 WBCS — SIGNIFICANT CHANGE UP (ref 0–0)
PLATELET # BLD AUTO: 120 K/UL — LOW (ref 150–400)
POTASSIUM SERPL-MCNC: 4.3 MMOL/L — SIGNIFICANT CHANGE UP (ref 3.5–5.3)
POTASSIUM SERPL-SCNC: 4.3 MMOL/L — SIGNIFICANT CHANGE UP (ref 3.5–5.3)
RBC # BLD: 2.73 M/UL — LOW (ref 3.8–5.2)
RBC # FLD: 13.1 % — SIGNIFICANT CHANGE UP (ref 10.3–14.5)
SODIUM SERPL-SCNC: 143 MMOL/L — SIGNIFICANT CHANGE UP (ref 135–145)
WBC # BLD: 4.74 K/UL — SIGNIFICANT CHANGE UP (ref 3.8–10.5)
WBC # FLD AUTO: 4.74 K/UL — SIGNIFICANT CHANGE UP (ref 3.8–10.5)

## 2025-02-14 PROCEDURE — 36415 COLL VENOUS BLD VENIPUNCTURE: CPT

## 2025-02-14 PROCEDURE — 80048 BASIC METABOLIC PNL TOTAL CA: CPT

## 2025-02-14 PROCEDURE — 97530 THERAPEUTIC ACTIVITIES: CPT

## 2025-02-14 PROCEDURE — 86850 RBC ANTIBODY SCREEN: CPT

## 2025-02-14 PROCEDURE — 86901 BLOOD TYPING SEROLOGIC RH(D): CPT

## 2025-02-14 PROCEDURE — C1713: CPT

## 2025-02-14 PROCEDURE — C1776: CPT

## 2025-02-14 PROCEDURE — 97535 SELF CARE MNGMENT TRAINING: CPT

## 2025-02-14 PROCEDURE — 73560 X-RAY EXAM OF KNEE 1 OR 2: CPT

## 2025-02-14 PROCEDURE — 97161 PT EVAL LOW COMPLEX 20 MIN: CPT

## 2025-02-14 PROCEDURE — 97116 GAIT TRAINING THERAPY: CPT

## 2025-02-14 PROCEDURE — 99232 SBSQ HOSP IP/OBS MODERATE 35: CPT

## 2025-02-14 PROCEDURE — 97165 OT EVAL LOW COMPLEX 30 MIN: CPT

## 2025-02-14 PROCEDURE — 97110 THERAPEUTIC EXERCISES: CPT

## 2025-02-14 PROCEDURE — 86900 BLOOD TYPING SEROLOGIC ABO: CPT

## 2025-02-14 PROCEDURE — 85027 COMPLETE CBC AUTOMATED: CPT

## 2025-02-14 PROCEDURE — C1889: CPT

## 2025-02-14 RX ORDER — ASPIRIN 81 MG/1
1 TABLET, COATED ORAL
Qty: 52 | Refills: 0
Start: 2025-02-14 | End: 2025-03-11

## 2025-02-14 RX ORDER — CELECOXIB 200 MG
1 CAPSULE ORAL
Qty: 60 | Refills: 0
Start: 2025-02-14 | End: 2025-03-15

## 2025-02-14 RX ORDER — PANTOPRAZOLE 20 MG/1
1 TABLET, DELAYED RELEASE ORAL
Qty: 30 | Refills: 0
Start: 2025-02-14 | End: 2025-03-15

## 2025-02-14 RX ORDER — OXYCODONE HYDROCHLORIDE 30 MG/1
1 TABLET ORAL
Qty: 42 | Refills: 0
Start: 2025-02-14

## 2025-02-14 RX ADMIN — VALACYCLOVIR 500 MILLIGRAM(S): 1000 TABLET ORAL at 13:50

## 2025-02-14 RX ADMIN — Medication 50 MILLIGRAM(S): at 13:51

## 2025-02-14 RX ADMIN — Medication 100 MILLIGRAM(S): at 09:52

## 2025-02-14 RX ADMIN — Medication 100 MILLIGRAM(S): at 10:51

## 2025-02-14 RX ADMIN — ACETAMINOPHEN 1000 MILLIGRAM(S): 160 SUSPENSION ORAL at 14:51

## 2025-02-14 RX ADMIN — PREGABALIN CAPSULES, CV 100 MILLIGRAM(S): 225 CAPSULE ORAL at 17:52

## 2025-02-14 RX ADMIN — PREGABALIN CAPSULES, CV 100 MILLIGRAM(S): 225 CAPSULE ORAL at 05:42

## 2025-02-14 RX ADMIN — ASPIRIN 81 MILLIGRAM(S): 81 TABLET, COATED ORAL at 17:52

## 2025-02-14 RX ADMIN — ACETAMINOPHEN 1000 MILLIGRAM(S): 160 SUSPENSION ORAL at 13:51

## 2025-02-14 RX ADMIN — PANTOPRAZOLE 40 MILLIGRAM(S): 20 TABLET, DELAYED RELEASE ORAL at 05:35

## 2025-02-14 RX ADMIN — ACETAMINOPHEN 1000 MILLIGRAM(S): 160 SUSPENSION ORAL at 05:43

## 2025-02-14 RX ADMIN — OXYCODONE HYDROCHLORIDE 5 MILLIGRAM(S): 30 TABLET ORAL at 13:51

## 2025-02-14 RX ADMIN — ACETAMINOPHEN 1000 MILLIGRAM(S): 160 SUSPENSION ORAL at 05:35

## 2025-02-14 RX ADMIN — ASPIRIN 81 MILLIGRAM(S): 81 TABLET, COATED ORAL at 05:35

## 2025-02-14 RX ADMIN — Medication 4 MILLIGRAM(S): at 05:35

## 2025-02-14 RX ADMIN — Medication 4 MILLIGRAM(S): at 13:50

## 2025-02-14 RX ADMIN — OXYCODONE HYDROCHLORIDE 5 MILLIGRAM(S): 30 TABLET ORAL at 14:51

## 2025-02-14 NOTE — PROGRESS NOTE ADULT - ASSESSMENT
63 yo female, pmh of hypertension, herpes, gerd, spinal stenosis, hld, glaucoma presenting for right knee replacement.    right knee replacement  - pain control - standing tylenol 1000 q8h,  - celebrex 100 bid  oxycodone 5/10 prn for mod/severe pain, dilaudid iv for breakthrough pain  - zanaflex  - bowel regimen   - aspirin bid for dvt ppx   - PT rec JELENA, though insurance refused so will dc with HPT (likely progressed enough)    htn - hold losartan, HCTZ and toprol due to hypotension. can look into resuming them sequentially if needed. Per patient she intermittently holds them based on BP    herpes - continue valtrex 500 daily for suppression (not infectious, no active signs of herpes)    hld - continue statin - lipitor 10, zocor non formulary    spinal stenosis - continue sertraline, lyrica    gerd- protonix    Check orthostatics. If negative then medically cleared  
65 yo female, pmh of hypertension, herpes, gerd, spinal stenosis, hld, glaucoma presenting for right knee replacement.    right knee replacement  - pain control - standing tylenol 1000 q8h,  - celebrex 100 bid  oxycodone 5/10 prn for mod/severe pain, dilaudid iv for breakthrough pain  - zanaflex  - bowel regimen   - aspirin bid for dvt ppx   - PT rec JELENA      htn - losartan, hold HCTZ for now, loose control acceptable to prevent hypotension  continue toprol with hold parameters    herpes - continue valtrex 500 daily for suppression    hld - continue statin - lipitor 10, zocor non formulary    spinal stenosis - continue sertraline, lyrica    gerd- protonix    Medically cleared for [facility]  
63 yo female, pmh of hypertension, herpes, gerd, spinal stenosis, hld, glaucoma presenting for right knee replacement.    right knee replacement  - pain control - standing tylenol 1000 q8h,  - celebrex 100 bid  oxycodone 5/10 prn for mod/severe pain, dilaudid iv for breakthrough pain  - zanaflex  - bowel regimen   - aspirin bid for dvt ppx   - PT rec [facility]ELENA    htn - hold losartan, HCTZ and toprol due to hypotension. can look into resuming them sequentially if needed. Per patient she intermittently holds them based on BP    herpes - continue valtrex 500 daily for suppression (not infectious, no active signs of herpes)    hld - continue statin - lipitor 10, zocor non formulary    spinal stenosis - continue sertraline, lyrica    gerd- protonix    Medically cleared for [facility]  
65 yo female, pmh of hypertension, herpes, gerd, spinal stenosis, hld, glaucoma presenting for right knee replacement.    right knee replacement  - pain control - standing tylenol 1000 q8h,  - celebrex 100 bid  oxycodone 5/10 prn for mod/severe pain, dilaudid iv for breakthrough pain  - zanaflex  - bowel regimen   - aspirin bid for dvt ppx   - PT rec [facility]ELE[facility]    htn - hold losartan, HCTZ and toprol due to hypotension. can look into resuming them sequentially if needed    herpes - continue valtrex 500 daily for suppression    hld - continue statin - lipitor 10, zocor non formulary    spinal stenosis - continue sertraline, lyrica    gerd- protonix    Medically cleared for [facility]ELENA

## 2025-02-14 NOTE — PROGRESS NOTE ADULT - SUBJECTIVE AND OBJECTIVE BOX
Orthopedics  Post Op Check - S/P Right Total Knee Arthroplasty      Pt alert and comfortable with no complaints, pain controlled. Denies nausea, vomiting, diarrhea, chest pain, shortness of breath, headache, dizziness. Patient is awaiting arrival of [facility] brace.    Vital Signs Last 24 Hrs  T(C): 36.4 (02-10-25 @ 12:43), Max: 36.4 (02-10-25 @ 11:02)  T(F): 97.5 (02-10-25 @ 12:43), Max: 97.5 (02-10-25 @ 11:02)  HR: 80 (02-10-25 @ 12:43) (65 - 83)  BP: 117/68 (02-10-25 @ 12:43) (115/65 - 136/78)  BP(mean): --  RR: 19 (02-10-25 @ 12:43) (13 - 19)  SpO2: 99% (02-10-25 @ 12:43) (97% - 100%)  I&O's Detail    10 Feb 2025 07:01  -  10 Feb 2025 14:44  --------------------------------------------------------  IN:    IV PiggyBack: 500 mL    Lactated Ringers: 1000 mL    Lactated Ringers: 100 mL    Oral Fluid: 240 mL    Sodium Chloride 0.9% Bolus: 500 mL  Total IN: 2340 mL    OUT:    Blood Loss (mL): 150 mL  Total OUT: 150 mL    Total NET: 2190 mL        I&O's Summary    10 Feb 2025 07:01  -  10 Feb 2025 14:44  --------------------------------------------------------  IN: 2340 mL / OUT: 150 mL / NET: 2190 mL                     MEDICATIONS:acetaminophen     Tablet .. 1000 milliGRAM(s) Oral every 8 hours  acetaminophen   IVPB .. 1000 milliGRAM(s) IV Intermittent once  aluminum hydroxide/magnesium hydroxide/simethicone Suspension 30 milliLiter(s) Oral four times a day PRN  atorvastatin 20 milliGRAM(s) Oral at bedtime  ceFAZolin   IVPB 2000 milliGRAM(s) IV Intermittent every 8 hours  celecoxib 100 milliGRAM(s) Oral every 12 hours  HYDROmorphone  Injectable 0.5 milliGRAM(s) IV Push every 3 hours PRN  lactated ringers. 1000 milliLiter(s) IV Continuous <Continuous>  magnesium hydroxide Suspension 30 milliLiter(s) Oral daily PRN  metoprolol succinate ER 25 milliGRAM(s) Oral daily  ondansetron Injectable 4 milliGRAM(s) IV Push every 6 hours PRN  oxyCODONE    IR 5 milliGRAM(s) Oral every 3 hours PRN  oxyCODONE    IR 10 milliGRAM(s) Oral every 3 hours PRN  pantoprazole    Tablet 40 milliGRAM(s) Oral before breakfast  polyethylene glycol 3350 17 Gram(s) Oral at bedtime  pregabalin 100 milliGRAM(s) Oral two times a day  senna 2 Tablet(s) Oral at bedtime  sertraline 50 milliGRAM(s) Oral daily  tiZANidine 4 milliGRAM(s) Oral every 8 hours  valACYclovir 500 milliGRAM(s) Oral daily    Anticoagulation:      Antibiotics:   ceFAZolin   IVPB 2000 milliGRAM(s) IV Intermittent every 8 hours  valACYclovir 500 milliGRAM(s) Oral daily      Pain medications:   acetaminophen     Tablet .. 1000 milliGRAM(s) Oral every 8 hours  acetaminophen   IVPB .. 1000 milliGRAM(s) IV Intermittent once  celecoxib 100 milliGRAM(s) Oral every 12 hours  HYDROmorphone  Injectable 0.5 milliGRAM(s) IV Push every 3 hours PRN  ondansetron Injectable 4 milliGRAM(s) IV Push every 6 hours PRN  oxyCODONE    IR 5 milliGRAM(s) Oral every 3 hours PRN  oxyCODONE    IR 10 milliGRAM(s) Oral every 3 hours PRN  pregabalin 100 milliGRAM(s) Oral two times a day  sertraline 50 milliGRAM(s) Oral daily  tiZANidine 4 milliGRAM(s) Oral every 8 hours        PE:  Right Knee-primary surgical bandage dry and intact.  Feet mobile and sensate.  EHLs/ant.tibs. 5/5  PAS on LE's.  Calves soft and nontender. DP and PT pulses 2+    A/P: Ortho stable post-op s/p Right Total Knee Arthroplasty  - Continue post-op orders; pain management with PO Oxycodone, Tylenol, Celebrex, IV Dilaudid breakthrough  - Check labs in A.M.  - DVT prevention with Aspirin 81mg BID  - PT /OT for OOB, full WBAT RLE in [facility] brace at all times  - Medical consult  -Discharge planning  -Will continue to monitor closely with attendings.
POST OPERATIVE DAY #:  [2 ]   STATUS POST: [ ] Left [ x] Right  [ x]TKR [ ]THR                      MCL tear  Patient is a 64y old  Female who presents with a chief complaint of right knee pain--for right tka w/ repair mcl (12 Feb 2025 09:35)      Pain well controlled    OBJECTIVE:     Vital Signs Last 24 Hrs  T(C): 36.7 (13 Feb 2025 07:29), Max: 36.7 (13 Feb 2025 07:29)  T(F): 98.1 (13 Feb 2025 07:29), Max: 98.1 (13 Feb 2025 07:29)  HR: 52 (13 Feb 2025 07:29) (52 - 67)  BP: 100/66 (13 Feb 2025 07:29) (91/60 - 126/66)  BP(mean): --  RR: 18 (13 Feb 2025 07:29) (17 - 18)  SpO2: 98% (13 Feb 2025 07:29) (92% - 100%)    Parameters below as of 13 Feb 2025 07:29  Patient On (Oxygen Delivery Method): room air        Affected extremity:          Dressing:  clean/dry/intact          Sensation; intact to light touch          Motor exam: Toes warm and mobile         No calf tenderness bilateral LE's    LABS:                        8.4    6.83  )-----------( 104      ( 12 Feb 2025 06:30 )             25.0     02-12    145  |  112[H]  |  19  ----------------------------<  127[H]  3.9   |  25  |  0.96    Ca    8.8      12 Feb 2025 06:30              A/P :    -    Analgesics PRN  -    DVT ppx: [x ]ASA 81 bid [ ] Lovenox [ ] Coumadin   [ ] Eliquis   -    Check AM labs  -    Weight bearing status: WBAT [ x]  with Brace unlocked  -    Physical Therapy  -    Occupational Therapy  -    Dispo: Home [ ]     Rehab [ ]      [facility] [ ]      To be determined [ x]  
Post Op Day #1    SUBJECTIVE    65yo Female status post right TKR .   Patient is alert and comfortable.    Pain is controlled with current pain regimen.  Denies nausea, vomiting, chest pain, shortness of breath, abdominal pain or fever.   No new complaints.    OBJECTIVE    Vital Signs Last 24 Hrs  T(C): 36.4 (11 Feb 2025 07:40), Max: 36.7 (11 Feb 2025 03:00)  T(F): 97.6 (11 Feb 2025 07:40), Max: 98 (11 Feb 2025 03:00)  HR: 48 (11 Feb 2025 07:40) (48 - 83)  BP: 128/78 (11 Feb 2025 07:40) (89/59 - 146/83)  BP(mean): --  RR: 18 (11 Feb 2025 07:40) (13 - 19)  SpO2: 97% (11 Feb 2025 07:40) (96% - 100%)    Parameters below as of 11 Feb 2025 07:40  Patient On (Oxygen Delivery Method): room air      I&O's Summary    10 Feb 2025 07:01  -  11 Feb 2025 07:00  --------------------------------------------------------  IN: 4640 mL / OUT: 850 mL / NET: 3790 mL        PHYSICAL EXAM    Right knee Mepilex dressing is clean, dry and intact.   The calf is supple/nontender bilaterally  Sensation to light touch is grossly intact distally bilaterally   Motor function distally is intact bilaterally  No foot drop bilaterally  (2+) dorsalis pedis pulse. Capillary refill is less than 2 seconds. No cyanosis.                          9.5[L]  8.66  )-----------( 120[L]    ( 11 Feb 2025 06:00 )             28.2[L]  11 Feb 2025 06:00    11 Feb 2025 06:00    141    |  107    |  21     ----------------------------<  155[H]  3.9     |  28     |  1.05     Ca    9.0        11 Feb 2025 06:00        ASSESSMENT AND PLAN    - 64y.o female s/p right TKR  - DVT prophylaxis: PAS + Aspirin 81mg every 12 hours  - Continue physical therapy and occupational therapy  - Weight bearing as tolerated of the right lower extremity with jose brace, unlocked  - Incentive spirometry encouraged  - Pain control as clinically indicated  - Disposition: subacute rehabilitation      
POST OPERATIVE DAY #:  [3 ]   STATUS POST: [ ] Left [x ] Right  [x ]TKR [ ]THR                        Patient is a 64y old  Female who presents with a chief complaint of right knee replacement (13 Feb 2025 04:01)      Pain well controlled    OBJECTIVE:     Vital Signs Last 24 Hrs  T(C): 36.7 (14 Feb 2025 07:43), Max: 36.7 (13 Feb 2025 15:39)  T(F): 98 (14 Feb 2025 07:43), Max: 98.1 (13 Feb 2025 15:39)  HR: 60 (14 Feb 2025 07:43) (60 - 73)  BP: 106/72 (14 Feb 2025 07:43) (94/65 - 128/80)  BP(mean): --  RR: 20 (14 Feb 2025 07:43) (15 - 20)  SpO2: 97% (14 Feb 2025 07:43) (94% - 97%)    Parameters below as of 14 Feb 2025 07:43  Patient On (Oxygen Delivery Method): room air        Affected extremity:          Dressing:  clean/dry/intact  [ ] Other:           Sensation; intact to light touch  [ ] decreased:          Motor exam: Toes warm and mobile        No calf tenderness bilateral LE's    LABS:                        8.0    4.74  )-----------( 120      ( 14 Feb 2025 06:00 )             24.7     02-13    143  |  108  |  22  ----------------------------<  113[H]  3.6   |  29  |  0.95    Ca    8.5      13 Feb 2025 06:00              A/P :    -    Analgesics PRN  -    DVT ppx: [x ]ASA 81 bid [ ] Lovenox [ ] Coumadin   [ ] Eliquis   -    Check AM labs  -    Weight bearing status: WBAT [x ]  with brace  -    Physical Therapy  -    Occupational Therapy  -    Dispo: Home [ ]     Rehab [ ]      [facility] [x ]      To be determined [ ]  
Patient is a 64y old  Female who presents with a chief complaint of tka (11 Feb 2025 11:58)      SUBJECTIVE / OVERNIGHT EVENTS: Patient hypotensive yesterday and given 1 L NS. NO dizziness or lightheadedness. No pain in right knee.    MEDICATIONS  (STANDING):  acetaminophen     Tablet .. 1000 milliGRAM(s) Oral every 8 hours  aspirin enteric coated 81 milliGRAM(s) Oral every 12 hours  atorvastatin 20 milliGRAM(s) Oral at bedtime  celecoxib 100 milliGRAM(s) Oral every 12 hours  pantoprazole    Tablet 40 milliGRAM(s) Oral before breakfast  polyethylene glycol 3350 17 Gram(s) Oral at bedtime  pregabalin 100 milliGRAM(s) Oral two times a day  senna 2 Tablet(s) Oral at bedtime  sertraline 50 milliGRAM(s) Oral daily  tiZANidine 4 milliGRAM(s) Oral every 8 hours  valACYclovir 500 milliGRAM(s) Oral daily    MEDICATIONS  (PRN):  aluminum hydroxide/magnesium hydroxide/simethicone Suspension 30 milliLiter(s) Oral four times a day PRN Indigestion  bisacodyl Suppository 10 milliGRAM(s) Rectal once PRN Constipation  HYDROmorphone  Injectable 0.5 milliGRAM(s) IV Push every 3 hours PRN Breakthrough pain  magnesium hydroxide Suspension 30 milliLiter(s) Oral daily PRN Constipation  ondansetron Injectable 4 milliGRAM(s) IV Push every 6 hours PRN Nausea and/or Vomiting  oxyCODONE    IR 5 milliGRAM(s) Oral every 3 hours PRN Moderate Pain (4 - 6)  oxyCODONE    IR 10 milliGRAM(s) Oral every 3 hours PRN Severe Pain (7 - 10)      CAPILLARY BLOOD GLUCOSE        I&O's Summary    11 Feb 2025 07:01  -  12 Feb 2025 07:00  --------------------------------------------------------  IN: 0 mL / OUT: 1400 mL / NET: -1400 mL        PHYSICAL EXAM:  Vital Signs Last 24 Hrs  T(C): 36.6 (12 Feb 2025 07:32), Max: 37.1 (11 Feb 2025 23:00)  T(F): 97.8 (12 Feb 2025 07:32), Max: 98.8 (11 Feb 2025 23:00)  HR: 51 (12 Feb 2025 07:32) (50 - 59)  BP: 98/58 (12 Feb 2025 07:32) (78/52 - 109/71)  BP(mean): 63 (11 Feb 2025 15:55) (63 - 63)  RR: 18 (12 Feb 2025 07:32) (16 - 18)  SpO2: 97% (12 Feb 2025 07:32) (97% - 99%)    Parameters below as of 12 Feb 2025 07:32  Patient On (Oxygen Delivery Method): room air        GEN: female in NAD, appears comfortable, no diaphoresis  EYES: No scleral injection, EOMI  ENTM: neck supple & symmetric without tracheal deviation, moist membranes, no gross hearing impairment  CV: +S1/S2, no m/r/g, no abdominal bruit, no LE edema  RESP: breathing comfortably, no respiratory accessory muscle use, CTAB, no w/r/r  GI: normoactive BS, soft, NTND, no rebounding/guarding, no palpable masses    LABS:                        9.5    8.66  )-----------( 120      ( 11 Feb 2025 06:00 )             28.2     02-11    141  |  107  |  21  ----------------------------<  155[H]  3.9   |  28  |  1.05    Ca    9.0      11 Feb 2025 06:00            Urinalysis Basic - ( 11 Feb 2025 06:00 )    Color: x / Appearance: x / SG: x / pH: x  Gluc: 155 mg/dL / Ketone: x  / Bili: x / Urobili: x   Blood: x / Protein: x / Nitrite: x   Leuk Esterase: x / RBC: x / WBC x   Sq Epi: x / Non Sq Epi: x / Bacteria: x          RADIOLOGY & ADDITIONAL TESTS:  Results Reviewed:   Imaging Personally Reviewed:  Electrocardiogram Personally Reviewed:    COORDINATION OF CARE:  Care Discussed with Consultants/Other Providers [Y/N]:  Prior or Outpatient Records Reviewed [Y/N]:  
Discharge Medication Counseling:  [id number] BID x 4 weeks  Pantoprazole 40mg QAM x 4 weeks   Celebrex 200mg BID x 2-3 weeks  APAP 1000mg PO Q8h x 2-3 weeks  Narcotic PRN  Docusate 100mg TID while taking narcotic  Miralax, Senna, or Bisacodyl PRN for treatment of constipation  cefadroxil 500mg BID x 7 days 
Procedure: Right TKR  POD #: 2  S: Pt without complaints. No SOB,CP, N/V. Tolerated Diet well.   Pain comfortable on tylenol, celebrex and oxycodone. oxy 5 taken once, oxy 10 taken once  No BM yet, + flatus, No abdominal pain.  Pain Rx:  acetaminophen     Tablet .. 1000 milliGRAM(s) Oral every 8 hours  celecoxib 100 milliGRAM(s) Oral every 12 hours  HYDROmorphone  Injectable 0.5 milliGRAM(s) IV Push every 3 hours PRN  ondansetron Injectable 4 milliGRAM(s) IV Push every 6 hours PRN  oxyCODONE    IR 5 milliGRAM(s) Oral every 3 hours PRN  oxyCODONE    IR 10 milliGRAM(s) Oral every 3 hours PRN  pregabalin 100 milliGRAM(s) Oral two times a day  sertraline 50 milliGRAM(s) Oral daily  tiZANidine 4 milliGRAM(s) Oral every 8 hours    O: General: On exam, No Apparent Distress  Vital Signs Last 24 Hrs  T(C): 36.6 (12 Feb 2025 07:32), Max: 37.1 (11 Feb 2025 23:00)  T(F): 97.8 (12 Feb 2025 07:32), Max: 98.8 (11 Feb 2025 23:00)  HR: 51 (12 Feb 2025 07:32) (50 - 59)  BP: 98/58 (12 Feb 2025 07:32) (78/52 - 109/71)  BP(mean): 63 (11 Feb 2025 15:55) (63 - 63)  RR: 18 (12 Feb 2025 07:32) (16 - 18)  SpO2: 97% (12 Feb 2025 07:32) (97% - 99%)    Parameters below as of 12 Feb 2025 07:32  Patient On (Oxygen Delivery Method): room air        Lungs: BS clear bilat.  Heart: RRR no murmur  Abdomen: + BS, soft , benign exam     Ext -- right knee w/ mepilex dry.  some swelling as expected  ROM: 0-75  Neurologic:  Has sensation over feet & toes bilat. Full AROM bilat feet & toes. EHL/AT = 5/5  Vascular: Feet toes warm, pink. DP = 2+. No calf tenderness bilat..  VTEP: On Bilat. Venodynes + aspirin enteric coated 81 milliGRAM(s) Oral every 12 hours    I&O's Detail    11 Feb 2025 07:01  -  12 Feb 2025 07:00  --------------------------------------------------------  IN:  Total IN: 0 mL    OUT:    Voided (mL): 1400 mL  Total OUT: 1400 mL    Total NET: -1400 mL          Activity in PT yesterday: [id number] and did 2 stairs  Labs yesterday noted.  Hospitalist input noted.  Labs Today:                         8.4    6.83  )-----------( 104      ( 12 Feb 2025 06:30 )             25.0       02-12    145  |  112[H]  |  19  ----------------------------<  127[H]  3.9   |  25  |  0.96    Ca    8.8      12 Feb 2025 06:30        Primary Orthopedic Assessment:  • Stable from Orthopedic perspective  • Neuro motor exam stable  • Labs: CBC--post op anemia well tolderated / Chem stable      Plan:   • Continue:  PT/OT/WBAT with assistance of a walker/Ice to knee/ Knee ROM         Incentive spirometry encouraged   • Continue DVT prophylaxis as prescribed, including use of compression devices and ankle pumps  • Continue Pain Rx  • Plans per Medicine   • Discharge planning – anticipated discharge is   Subacute Rehab facility when medically stable & cleared by PT/OT --today if insurance auth obtained.  
Patient is a 64y old  Female who presents with a chief complaint of right knee pain--for right tka w/ repair mcl (12 Feb 2025 09:35)      SUBJECTIVE / OVERNIGHT EVENTS: Patient seen and examined at bedside. No acute events overnight. Awaiting DC to [facility].    MEDICATIONS  (STANDING):  acetaminophen     Tablet .. 1000 milliGRAM(s) Oral every 8 hours  aspirin enteric coated 81 milliGRAM(s) Oral every 12 hours  atorvastatin 20 milliGRAM(s) Oral at bedtime  celecoxib 100 milliGRAM(s) Oral every 12 hours  pantoprazole    Tablet 40 milliGRAM(s) Oral before breakfast  polyethylene glycol 3350 17 Gram(s) Oral at bedtime  pregabalin 100 milliGRAM(s) Oral two times a day  senna 2 Tablet(s) Oral at bedtime  sertraline 50 milliGRAM(s) Oral daily  tiZANidine 4 milliGRAM(s) Oral every 8 hours  valACYclovir 500 milliGRAM(s) Oral daily    MEDICATIONS  (PRN):  aluminum hydroxide/magnesium hydroxide/simethicone Suspension 30 milliLiter(s) Oral four times a day PRN Indigestion  bisacodyl Suppository 10 milliGRAM(s) Rectal once PRN Constipation  HYDROmorphone  Injectable 0.5 milliGRAM(s) IV Push every 3 hours PRN Breakthrough pain  magnesium hydroxide Suspension 30 milliLiter(s) Oral daily PRN Constipation  ondansetron Injectable 4 milliGRAM(s) IV Push every 6 hours PRN Nausea and/or Vomiting  oxyCODONE    IR 5 milliGRAM(s) Oral every 3 hours PRN Moderate Pain (4 - 6)  oxyCODONE    IR 10 milliGRAM(s) Oral every 3 hours PRN Severe Pain (7 - 10)      CAPILLARY BLOOD GLUCOSE        I&O's Summary      PHYSICAL EXAM:  Vital Signs Last 24 Hrs  T(C): 36.7 (13 Feb 2025 07:29), Max: 36.7 (13 Feb 2025 07:29)  T(F): 98.1 (13 Feb 2025 07:29), Max: 98.1 (13 Feb 2025 07:29)  HR: 52 (13 Feb 2025 07:29) (52 - 67)  BP: 100/66 (13 Feb 2025 07:29) (91/60 - 126/66)  BP(mean): --  RR: 18 (13 Feb 2025 07:29) (17 - 18)  SpO2: 98% (13 Feb 2025 07:29) (92% - 100%)    Parameters below as of 13 Feb 2025 07:29  Patient On (Oxygen Delivery Method): room air        GEN: female in NAD, appears comfortable, no diaphoresis  EYES: No scleral injection, EOMI  ENTM: neck supple & symmetric without tracheal deviation, moist membranes, no gross hearing impairment  CV: +S1/S2, no m/r/g, no abdominal bruit, no LE edema  RESP: breathing comfortably, no respiratory accessory muscle use, CTAB, no w/r/r  GI: normoactive BS, soft, NTND, no rebounding/guarding, no palpable masses    LABS:                        8.1    5.78  )-----------( 113      ( 13 Feb 2025 06:00 )             24.8     02-13    143  |  108  |  22  ----------------------------<  113[H]  3.6   |  29  |  0.95    Ca    8.5      13 Feb 2025 06:00            Urinalysis Basic - ( 13 Feb 2025 06:00 )    Color: x / Appearance: x / SG: x / pH: x  Gluc: 113 mg/dL / Ketone: x  / Bili: x / Urobili: x   Blood: x / Protein: x / Nitrite: x   Leuk Esterase: x / RBC: x / WBC x   Sq Epi: x / Non Sq Epi: x / Bacteria: x          RADIOLOGY & ADDITIONAL TESTS:  Results Reviewed:   Imaging Personally Reviewed:  Electrocardiogram Personally Reviewed:    COORDINATION OF CARE:  Care Discussed with Consultants/Other Providers [Y/N]:  Prior or Outpatient Records Reviewed [Y/N]:  
Patient is a 64y old  Female who presents with a chief complaint of right knee replacement (10 Feb 2025 15:08)      SUBJECTIVE / OVERNIGHT EVENTS: Patient seen and examined at bedside. No acute events overnight. No pain in right knee.    MEDICATIONS  (STANDING):  acetaminophen     Tablet .. 1000 milliGRAM(s) Oral every 8 hours  aspirin enteric coated 81 milliGRAM(s) Oral every 12 hours  atorvastatin 20 milliGRAM(s) Oral at bedtime  celecoxib 100 milliGRAM(s) Oral every 12 hours  lactated ringers. 1000 milliLiter(s) (100 mL/Hr) IV Continuous <Continuous>  metoprolol succinate ER 25 milliGRAM(s) Oral daily  pantoprazole    Tablet 40 milliGRAM(s) Oral before breakfast  polyethylene glycol 3350 17 Gram(s) Oral at bedtime  pregabalin 100 milliGRAM(s) Oral two times a day  senna 2 Tablet(s) Oral at bedtime  sertraline 50 milliGRAM(s) Oral daily  tiZANidine 4 milliGRAM(s) Oral every 8 hours  valACYclovir 500 milliGRAM(s) Oral daily    MEDICATIONS  (PRN):  aluminum hydroxide/magnesium hydroxide/simethicone Suspension 30 milliLiter(s) Oral four times a day PRN Indigestion  HYDROmorphone  Injectable 0.5 milliGRAM(s) IV Push every 3 hours PRN Breakthrough pain  magnesium hydroxide Suspension 30 milliLiter(s) Oral daily PRN Constipation  ondansetron Injectable 4 milliGRAM(s) IV Push every 6 hours PRN Nausea and/or Vomiting  oxyCODONE    IR 5 milliGRAM(s) Oral every 3 hours PRN Moderate Pain (4 - 6)  oxyCODONE    IR 10 milliGRAM(s) Oral every 3 hours PRN Severe Pain (7 - 10)      CAPILLARY BLOOD GLUCOSE        I&O's Summary    10 Feb 2025 07:01  -  11 Feb 2025 07:00  --------------------------------------------------------  IN: 4640 mL / OUT: 850 mL / NET: 3790 mL        PHYSICAL EXAM:  Vital Signs Last 24 Hrs  T(C): 36.4 (11 Feb 2025 07:40), Max: 36.7 (11 Feb 2025 03:00)  T(F): 97.6 (11 Feb 2025 07:40), Max: 98 (11 Feb 2025 03:00)  HR: 48 (11 Feb 2025 07:40) (48 - 83)  BP: 128/78 (11 Feb 2025 07:40) (89/59 - 146/83)  BP(mean): --  RR: 18 (11 Feb 2025 07:40) (13 - 19)  SpO2: 97% (11 Feb 2025 07:40) (96% - 100%)    Parameters below as of 11 Feb 2025 07:40  Patient On (Oxygen Delivery Method): room air        GEN: female in NAD, appears comfortable, no diaphoresis  EYES: No scleral injection, EOMI  ENTM: neck supple & symmetric without tracheal deviation, moist membranes, no gross hearing impairment  CV: +S1/S2, no m/r/g, no abdominal bruit, no LE edema  RESP: breathing comfortably, no respiratory accessory muscle use, CTAB, no w/r/r  GI: normoactive BS, soft, NTND, no rebounding/guarding, no palpable masses    LABS:                      RADIOLOGY & ADDITIONAL TESTS:  Results Reviewed:   Imaging Personally Reviewed:  Electrocardiogram Personally Reviewed:    COORDINATION OF CARE:  Care Discussed with Consultants/Other Providers [Y/N]:  Prior or Outpatient Records Reviewed [Y/N]:  
Patient is a 64y old  Female who presents with a chief complaint of right knee replacement (13 Feb 2025 04:01)      SUBJECTIVE / OVERNIGHT EVENTS: Patient seen and examined at bedside. No acute events overnight. Felt a little dizzy after using the bathroom. No dizziness now. Denies pain.    MEDICATIONS  (STANDING):  acetaminophen     Tablet .. 1000 milliGRAM(s) Oral every 8 hours  aspirin enteric coated 81 milliGRAM(s) Oral every 12 hours  atorvastatin 20 milliGRAM(s) Oral at bedtime  celecoxib 100 milliGRAM(s) Oral every 12 hours  pantoprazole    Tablet 40 milliGRAM(s) Oral before breakfast  polyethylene glycol 3350 17 Gram(s) Oral at bedtime  pregabalin 100 milliGRAM(s) Oral two times a day  senna 2 Tablet(s) Oral at bedtime  sertraline 50 milliGRAM(s) Oral daily  tiZANidine 4 milliGRAM(s) Oral every 8 hours  valACYclovir 500 milliGRAM(s) Oral daily    MEDICATIONS  (PRN):  aluminum hydroxide/magnesium hydroxide/simethicone Suspension 30 milliLiter(s) Oral four times a day PRN Indigestion  bisacodyl Suppository 10 milliGRAM(s) Rectal once PRN Constipation  HYDROmorphone  Injectable 0.5 milliGRAM(s) IV Push every 3 hours PRN Breakthrough pain  magnesium hydroxide Suspension 30 milliLiter(s) Oral daily PRN Constipation  ondansetron Injectable 4 milliGRAM(s) IV Push every 6 hours PRN Nausea and/or Vomiting  oxyCODONE    IR 10 milliGRAM(s) Oral every 3 hours PRN Severe Pain (7 - 10)  oxyCODONE    IR 5 milliGRAM(s) Oral every 3 hours PRN Moderate Pain (4 - 6)      CAPILLARY BLOOD GLUCOSE        I&O's Summary      PHYSICAL EXAM:  Vital Signs Last 24 Hrs  T(C): 36.7 (14 Feb 2025 07:43), Max: 36.7 (13 Feb 2025 15:39)  T(F): 98 (14 Feb 2025 07:43), Max: 98.1 (13 Feb 2025 15:39)  HR: 60 (14 Feb 2025 07:43) (60 - 73)  BP: 160/95 (14 Feb 2025 09:07) (94/65 - 160/95)  BP(mean): --  RR: 20 (14 Feb 2025 07:43) (15 - 20)  SpO2: 97% (14 Feb 2025 07:43) (94% - 97%)    Parameters below as of 14 Feb 2025 07:43  Patient On (Oxygen Delivery Method): room air        GEN: female in NAD, appears comfortable, no diaphoresis  EYES: No scleral injection, EOMI  ENTM: neck supple & symmetric without tracheal deviation, moist membranes, no gross hearing impairment  CV: +S1/S2, no m/r/g, no abdominal bruit, no LE edema  RESP: breathing comfortably, no respiratory accessory muscle use, CTAB, no w/r/r  GI: normoactive BS, soft, NTND, no rebounding/guarding, no palpable masses    LABS:                        8.0    4.74  )-----------( 120      ( 14 Feb 2025 06:00 )             24.7     02-14    143  |  109[H]  |  19  ----------------------------<  143[H]  4.3   |  32[H]  |  0.99    Ca    8.8      14 Feb 2025 06:00            Urinalysis Basic - ( 14 Feb 2025 06:00 )    Color: x / Appearance: x / SG: x / pH: x  Gluc: 143 mg/dL / Ketone: x  / Bili: x / Urobili: x   Blood: x / Protein: x / Nitrite: x   Leuk Esterase: x / RBC: x / WBC x   Sq Epi: x / Non Sq Epi: x / Bacteria: x          RADIOLOGY & ADDITIONAL TESTS:  Results Reviewed:   Imaging Personally Reviewed:  Electrocardiogram Personally Reviewed:    COORDINATION OF CARE:  Care Discussed with Consultants/Other Providers [Y/N]:  Prior or Outpatient Records Reviewed [Y/N]:

## 2025-02-14 NOTE — CASE MANAGEMENT PROGRESS NOTE - NSCMPROGRESSNOTE_GEN_ALL_CORE
CM met with patient today to discuss transition of care. Patient was denied [facility] transition and is now cleared for home.   Pt. is agreeable with PT/OT's recommendations for d/c plan to home with HC/PT.  CM explained home care expectations, process, insurance provisions and home safety with good understanding.   Offered list of [facility] and pt. chose [facility] home care agency.  Provided discharge resources folder. CM made referral accordingly.  Informed them of Anticipated  DC date for today 2/14/2025 and for SOC tomorrow 2/15/2025. IMM reviewed with patient and signed. Patients family will be available to transport patient home and assist at home as needed.

## 2025-02-14 NOTE — PROGRESS NOTE ADULT - PROVIDER SPECIALTY LIST ADULT
Internal Medicine
Orthopedics
Pharmacy
Orthopedics

## 2025-02-14 NOTE — SOCIAL WORK PROGRESS NOTE - NSSWPROGRESSNOTE_GEN_ALL_CORE
Message left on voicemail for insurance stating that the pt was denied subacute rehab. Peer to peer number is  opt 5. Discussed with ortho P.A who felt as though pt does not need subacute rehab and can go home with home care. CM and this SW met with the pt to notify her. Pt is scheduled for dc home with home care. No further SW intervention required.

## 2025-02-14 NOTE — PROGRESS NOTE ADULT - REASON FOR ADMISSION
right knee pain--for right tka w/ repair mcl
right knee replacement
tka
right knee replacement

## 2025-02-17 ENCOUNTER — NON-APPOINTMENT (OUTPATIENT)
Age: 65
End: 2025-02-17

## 2025-02-28 ENCOUNTER — APPOINTMENT (OUTPATIENT)
Dept: ORTHOPEDIC SURGERY | Facility: CLINIC | Age: 65
End: 2025-02-28
Payer: MEDICARE

## 2025-02-28 DIAGNOSIS — Z96.651 PRESENCE OF RIGHT ARTIFICIAL KNEE JOINT: ICD-10-CM

## 2025-02-28 PROCEDURE — 73562 X-RAY EXAM OF KNEE 3: CPT | Mod: RT

## 2025-02-28 PROCEDURE — 99024 POSTOP FOLLOW-UP VISIT: CPT

## 2025-02-28 RX ORDER — OXYCODONE 5 MG/1
5 TABLET ORAL
Qty: 20 | Refills: 0 | Status: ACTIVE | COMMUNITY
Start: 2025-02-28 | End: 1900-01-01

## 2025-02-28 RX ORDER — AMOXICILLIN 500 MG/1
500 CAPSULE ORAL
Qty: 20 | Refills: 5 | Status: ACTIVE | COMMUNITY
Start: 2025-02-28 | End: 1900-01-01

## 2025-03-17 ENCOUNTER — NON-APPOINTMENT (OUTPATIENT)
Age: 65
End: 2025-03-17

## 2025-03-20 NOTE — ED ADULT TRIAGE NOTE - DIRECT TO ROOM CARE INITIATED:
-- DO NOT REPLY / DO NOT REPLY ALL --  -- This inbox is not monitored. If this was sent to the wrong provider or department, reroute message to P ECO Reroute pool. --  -- Message is from Engagement Center Operations (ECO) --    General Patient Message: Patient says pharmacy will not release medication due to a drug interaction with other medications .traMADol (ULTRAM) 50 MG tablet   Caller Information       Contact Date/Time Type Contact Phone/Fax    03/17/2025 02:23 PM CDT Phone (Incoming) Ann [phone]    03/20/2025 02:03 PM CDT Phone (Incoming) Ann [phone]            Alternative phone number: na    Can a detailed message be left? Yes - Voicemail   Patient has been advised the message will be addressed within 2-3 business days.              Copied from CRM #[id number]. Topic: MW Medication/Rx - MW Patient Calling for RX Needs  >> Mar 20, 2025  2:04 PM Monica BRAR wrote:  Ann called with Question about medication during working hrs.   Selected 'Wrap Up CRM' and created new Telephone Encounter after clicking 'Convert to Clinical Call'. Selected reason for call 'Medication Issue'. Sent Med template and routed as routine priority per Care Site Dept KB page for Med Refill Working Hrs support to appropriate clinical pool. Readback full message.   17-Oct-2024 20:02

## 2025-04-18 ENCOUNTER — APPOINTMENT (OUTPATIENT)
Dept: ORTHOPEDIC SURGERY | Facility: CLINIC | Age: 65
End: 2025-04-18
Payer: MEDICARE

## 2025-04-18 PROCEDURE — 99024 POSTOP FOLLOW-UP VISIT: CPT

## 2025-04-18 PROCEDURE — 73562 X-RAY EXAM OF KNEE 3: CPT | Mod: RT

## 2025-04-18 RX ORDER — OXYCODONE 5 MG/1
5 TABLET ORAL
Qty: 40 | Refills: 0 | Status: ACTIVE | COMMUNITY
Start: 2025-04-18 | End: 1900-01-01

## 2025-04-22 ENCOUNTER — APPOINTMENT (OUTPATIENT)
Dept: PHYSICAL MEDICINE AND REHAB | Facility: CLINIC | Age: 65
End: 2025-04-22
Payer: MEDICARE

## 2025-04-22 DIAGNOSIS — G89.29 LUMBAGO WITH SCIATICA, RIGHT SIDE: ICD-10-CM

## 2025-04-22 DIAGNOSIS — M54.16 RADICULOPATHY, LUMBAR REGION: ICD-10-CM

## 2025-04-22 DIAGNOSIS — M54.41 LUMBAGO WITH SCIATICA, RIGHT SIDE: ICD-10-CM

## 2025-04-22 PROCEDURE — 99204 OFFICE O/P NEW MOD 45 MIN: CPT

## 2025-04-23 NOTE — ED PROVIDER NOTE - IV ALTEPLASE EXCL ABS HIDDEN
no rashes , no suspicious lesions , no areas of discoloration , no jaundice present , no masses , no tenderness on palpation
show

## 2025-04-26 ENCOUNTER — NON-APPOINTMENT (OUTPATIENT)
Age: 65
End: 2025-04-26

## 2025-05-08 ENCOUNTER — APPOINTMENT (OUTPATIENT)
Dept: PHYSICAL MEDICINE AND REHAB | Facility: AMBULATORY SURGERY CENTER | Age: 65
End: 2025-05-08
Payer: MEDICARE

## 2025-05-08 DIAGNOSIS — G89.29 LUMBAGO WITH SCIATICA, RIGHT SIDE: ICD-10-CM

## 2025-05-08 DIAGNOSIS — M54.16 RADICULOPATHY, LUMBAR REGION: ICD-10-CM

## 2025-05-08 DIAGNOSIS — M54.41 LUMBAGO WITH SCIATICA, RIGHT SIDE: ICD-10-CM

## 2025-05-08 PROCEDURE — 64483 NJX AA&/STRD TFRM EPI L/S 1: CPT | Mod: RT

## 2025-05-14 RX ORDER — CELECOXIB 100 MG/1
100 CAPSULE ORAL TWICE DAILY
Qty: 28 | Refills: 0 | Status: ACTIVE | COMMUNITY
Start: 2025-05-14 | End: 1900-01-01

## 2025-05-15 ENCOUNTER — NON-APPOINTMENT (OUTPATIENT)
Age: 65
End: 2025-05-15

## 2025-05-22 ENCOUNTER — APPOINTMENT (OUTPATIENT)
Dept: PHYSICAL MEDICINE AND REHAB | Facility: CLINIC | Age: 65
End: 2025-05-22
Payer: MEDICARE

## 2025-05-22 VITALS
SYSTOLIC BLOOD PRESSURE: 153 MMHG | HEART RATE: 91 BPM | BODY MASS INDEX: 29.41 KG/M2 | WEIGHT: 166 LBS | DIASTOLIC BLOOD PRESSURE: 94 MMHG | HEIGHT: 63 IN | RESPIRATION RATE: 14 BRPM

## 2025-05-22 DIAGNOSIS — M53.3 SACROCOCCYGEAL DISORDERS, NOT ELSEWHERE CLASSIFIED: ICD-10-CM

## 2025-05-22 DIAGNOSIS — M54.41 LUMBAGO WITH SCIATICA, RIGHT SIDE: ICD-10-CM

## 2025-05-22 DIAGNOSIS — G89.29 LUMBAGO WITH SCIATICA, RIGHT SIDE: ICD-10-CM

## 2025-05-22 PROCEDURE — 99214 OFFICE O/P EST MOD 30 MIN: CPT

## 2025-05-22 RX ORDER — DICLOFENAC SODIUM 10 MG/G
1 GEL TOPICAL 3 TIMES DAILY
Qty: 3 | Refills: 1 | Status: ACTIVE | COMMUNITY
Start: 2025-05-22 | End: 1900-01-01

## 2025-05-30 ENCOUNTER — APPOINTMENT (OUTPATIENT)
Dept: ORTHOPEDIC SURGERY | Facility: CLINIC | Age: 65
End: 2025-05-30
Payer: MEDICARE

## 2025-05-30 VITALS — HEIGHT: 63 IN | WEIGHT: 160 LBS | BODY MASS INDEX: 28.35 KG/M2

## 2025-05-30 DIAGNOSIS — Z96.651 PRESENCE OF RIGHT ARTIFICIAL KNEE JOINT: ICD-10-CM

## 2025-05-30 PROCEDURE — 73562 X-RAY EXAM OF KNEE 3: CPT | Mod: RT

## 2025-05-30 PROCEDURE — 99213 OFFICE O/P EST LOW 20 MIN: CPT

## 2025-07-03 ENCOUNTER — APPOINTMENT (OUTPATIENT)
Dept: PHYSICAL MEDICINE AND REHAB | Facility: CLINIC | Age: 65
End: 2025-07-03
Payer: MEDICARE

## 2025-07-03 ENCOUNTER — NON-APPOINTMENT (OUTPATIENT)
Age: 65
End: 2025-07-03

## 2025-07-03 VITALS
WEIGHT: 171.13 LBS | HEIGHT: 63 IN | SYSTOLIC BLOOD PRESSURE: 156 MMHG | HEART RATE: 54 BPM | RESPIRATION RATE: 15 BRPM | BODY MASS INDEX: 30.32 KG/M2 | DIASTOLIC BLOOD PRESSURE: 94 MMHG

## 2025-07-03 PROCEDURE — 99214 OFFICE O/P EST MOD 30 MIN: CPT

## 2025-07-03 PROCEDURE — G2211 COMPLEX E/M VISIT ADD ON: CPT

## 2025-07-26 ENCOUNTER — NON-APPOINTMENT (OUTPATIENT)
Age: 65
End: 2025-07-26

## 2025-09-08 ENCOUNTER — RX RENEWAL (OUTPATIENT)
Age: 65
End: 2025-09-08

## 2025-09-12 ENCOUNTER — APPOINTMENT (OUTPATIENT)
Facility: CLINIC | Age: 65
End: 2025-09-12
Payer: MEDICARE

## 2025-09-12 VITALS
HEIGHT: 63 IN | HEART RATE: 51 BPM | TEMPERATURE: 97 F | WEIGHT: 168 LBS | OXYGEN SATURATION: 97 % | BODY MASS INDEX: 29.77 KG/M2 | DIASTOLIC BLOOD PRESSURE: 79 MMHG | SYSTOLIC BLOOD PRESSURE: 127 MMHG

## 2025-09-12 DIAGNOSIS — M54.16 RADICULOPATHY, LUMBAR REGION: ICD-10-CM

## 2025-09-12 DIAGNOSIS — G89.29 LUMBAGO WITH SCIATICA, RIGHT SIDE: ICD-10-CM

## 2025-09-12 DIAGNOSIS — Z82.5 FAMILY HISTORY OF ASTHMA AND OTHER CHRONIC LOWER RESPIRATORY DISEASES: ICD-10-CM

## 2025-09-12 DIAGNOSIS — M54.41 LUMBAGO WITH SCIATICA, RIGHT SIDE: ICD-10-CM

## 2025-09-12 DIAGNOSIS — Z83.3 FAMILY HISTORY OF DIABETES MELLITUS: ICD-10-CM

## 2025-09-12 PROCEDURE — 99214 OFFICE O/P EST MOD 30 MIN: CPT

## 2025-09-12 PROCEDURE — G2211 COMPLEX E/M VISIT ADD ON: CPT

## (undated) DEVICE — VENODYNE/SCD SLEEVE CALF MEDIUM

## (undated) DEVICE — ORTHOALIGN PLUS UNIT

## (undated) DEVICE — SOLIDIFIER CANN EXPRESS 3K

## (undated) DEVICE — HOOD FLYTE STRYKER HELMET SHIELD

## (undated) DEVICE — WOUND IRR IRRISEPT W 0.5 CHG

## (undated) DEVICE — SOL IRR BAG NS 0.9% 3000ML

## (undated) DEVICE — ELCTR AQUAMANTYS BIPOLAR SEALER 6.0

## (undated) DEVICE — WARMING BLANKET UPPER ADULT

## (undated) DEVICE — TOURNIQUET CUFF 34" DUAL PORT W PLC

## (undated) DEVICE — ELCTR ROCKER SWITCH PENCIL BLUE 10FT

## (undated) DEVICE — STRYKER PULSE LAVAGE WITH COAXIAL MULTI-ORIFICE TIP

## (undated) DEVICE — SUT VICRYL PLUS 1 27" CP UNDYED

## (undated) DEVICE — SOL IRR POUR NS 0.9% 500ML

## (undated) DEVICE — DRSG DERMABOND PRINEO 60CM

## (undated) DEVICE — KIT DEFENDO 4 OLY 4 PC

## (undated) DEVICE — DRSG PICO NPWT 4X12"

## (undated) DEVICE — SYR LUER LOK 50CC

## (undated) DEVICE — CRYO/CUFF GRAVITY COOLER KNEE LARGE

## (undated) DEVICE — SOL IRR POUR H2O 1500ML

## (undated) DEVICE — SPECIMEN CONTAINER PET

## (undated) DEVICE — NDL SPINAL 18G X 3.5" (PINK)

## (undated) DEVICE — PACK TOTAL KNEE

## (undated) DEVICE — DRSG ACE BANDAGE 6"

## (undated) DEVICE — DRSG WEBRIL 6"

## (undated) DEVICE — SUT VICRYL PLUS 2-0 27" CP-1 UNDYED

## (undated) DEVICE — FORCEP ENDOJAW AGTR LC W NDL 2.8MM 230CM DISP

## (undated) DEVICE — SUT STRATAFIX SPIRAL MONOCRYL PLUS 3-0 30CM PS-2 UNDYED